# Patient Record
Sex: MALE | Employment: OTHER | URBAN - METROPOLITAN AREA
[De-identification: names, ages, dates, MRNs, and addresses within clinical notes are randomized per-mention and may not be internally consistent; named-entity substitution may affect disease eponyms.]

---

## 2017-01-30 PROBLEM — R26.9 ALTERED GAIT: Status: ACTIVE | Noted: 2017-01-30

## 2017-01-30 PROBLEM — IMO0001 VERTEBRAL COMPRESSION FRACTURE, SEQUELA: Status: ACTIVE | Noted: 2017-01-30

## 2017-07-26 PROCEDURE — 88305 TISSUE EXAM BY PATHOLOGIST: CPT | Performed by: INTERNAL MEDICINE

## 2017-10-03 ENCOUNTER — IMMUNIZATION (OUTPATIENT)
Dept: INTERNAL MEDICINE CLINIC | Facility: CLINIC | Age: 77
End: 2017-10-03

## 2017-10-03 PROCEDURE — 90653 IIV ADJUVANT VACCINE IM: CPT | Performed by: INTERNAL MEDICINE

## 2017-10-03 PROCEDURE — G0008 ADMIN INFLUENZA VIRUS VAC: HCPCS | Performed by: INTERNAL MEDICINE

## 2017-11-01 PROCEDURE — 86334 IMMUNOFIX E-PHORESIS SERUM: CPT | Performed by: OTHER

## 2017-11-01 PROCEDURE — 84425 ASSAY OF VITAMIN B-1: CPT | Performed by: OTHER

## 2017-11-01 PROCEDURE — 82607 VITAMIN B-12: CPT | Performed by: OTHER

## 2017-11-01 PROCEDURE — 84165 PROTEIN E-PHORESIS SERUM: CPT | Performed by: OTHER

## 2017-11-01 PROCEDURE — 82746 ASSAY OF FOLIC ACID SERUM: CPT | Performed by: OTHER

## 2017-11-01 PROCEDURE — 83883 ASSAY NEPHELOMETRY NOT SPEC: CPT | Performed by: OTHER

## 2017-11-01 PROCEDURE — 83921 ORGANIC ACID SINGLE QUANT: CPT | Performed by: OTHER

## 2017-12-08 PROBLEM — N39.41 URGE INCONTINENCE: Status: ACTIVE | Noted: 2017-12-08

## 2017-12-08 PROCEDURE — 88108 CYTOPATH CONCENTRATE TECH: CPT | Performed by: UROLOGY

## 2017-12-08 PROCEDURE — 81015 MICROSCOPIC EXAM OF URINE: CPT | Performed by: UROLOGY

## 2018-01-24 PROCEDURE — 81015 MICROSCOPIC EXAM OF URINE: CPT | Performed by: INTERNAL MEDICINE

## 2018-01-31 PROCEDURE — 81001 URINALYSIS AUTO W/SCOPE: CPT | Performed by: INTERNAL MEDICINE

## 2018-05-07 PROBLEM — IMO0001 VERTEBRAL COMPRESSION FRACTURE, SEQUELA: Status: RESOLVED | Noted: 2017-01-30 | Resolved: 2018-05-07

## 2018-05-07 PROBLEM — Z86.73 HISTORY OF CVA (CEREBROVASCULAR ACCIDENT) WITHOUT RESIDUAL DEFICITS: Status: ACTIVE | Noted: 2018-05-07

## 2018-07-16 PROBLEM — I65.23 BILATERAL CAROTID ARTERY STENOSIS: Status: ACTIVE | Noted: 2018-07-16

## 2018-10-12 ENCOUNTER — IMMUNIZATION (OUTPATIENT)
Dept: INTERNAL MEDICINE CLINIC | Facility: CLINIC | Age: 78
End: 2018-10-12
Payer: MEDICARE

## 2018-10-12 PROCEDURE — G0008 ADMIN INFLUENZA VIRUS VAC: HCPCS | Performed by: INTERNAL MEDICINE

## 2018-10-12 PROCEDURE — 90653 IIV ADJUVANT VACCINE IM: CPT | Performed by: INTERNAL MEDICINE

## 2018-11-02 PROCEDURE — 83520 IMMUNOASSAY QUANT NOS NONAB: CPT | Performed by: OTHER

## 2018-11-02 PROCEDURE — 86618 LYME DISEASE ANTIBODY: CPT | Performed by: OTHER

## 2018-11-02 PROCEDURE — 83921 ORGANIC ACID SINGLE QUANT: CPT | Performed by: OTHER

## 2019-05-13 PROBLEM — R26.9 ALTERED GAIT: Status: RESOLVED | Noted: 2017-01-30 | Resolved: 2019-05-13

## 2019-10-25 ENCOUNTER — IMMUNIZATION (OUTPATIENT)
Dept: INTERNAL MEDICINE CLINIC | Facility: CLINIC | Age: 79
End: 2019-10-25
Payer: MEDICARE

## 2019-10-25 DIAGNOSIS — Z23 NEED FOR VACCINATION: ICD-10-CM

## 2019-10-25 PROCEDURE — G0008 ADMIN INFLUENZA VIRUS VAC: HCPCS | Performed by: INTERNAL MEDICINE

## 2019-10-25 PROCEDURE — 90662 IIV NO PRSV INCREASED AG IM: CPT | Performed by: INTERNAL MEDICINE

## 2020-02-06 ENCOUNTER — HOSPITAL ENCOUNTER (INPATIENT)
Facility: HOSPITAL | Age: 80
LOS: 5 days | Discharge: NURSING, REHAB OR LONG-TERM CARE | DRG: 061 | End: 2020-02-12
Attending: EMERGENCY MEDICINE | Admitting: HOSPITALIST
Payer: MEDICARE

## 2020-02-06 DIAGNOSIS — I63.9 ACUTE ISCHEMIC STROKE (HCC): Primary | ICD-10-CM

## 2020-02-06 DIAGNOSIS — I44.7 LBBB (LEFT BUNDLE BRANCH BLOCK): ICD-10-CM

## 2020-02-07 ENCOUNTER — APPOINTMENT (OUTPATIENT)
Dept: CT IMAGING | Facility: HOSPITAL | Age: 80
DRG: 061 | End: 2020-02-07
Attending: EMERGENCY MEDICINE
Payer: MEDICARE

## 2020-02-07 ENCOUNTER — APPOINTMENT (OUTPATIENT)
Dept: GENERAL RADIOLOGY | Facility: HOSPITAL | Age: 80
DRG: 061 | End: 2020-02-07
Attending: HOSPITALIST
Payer: MEDICARE

## 2020-02-07 ENCOUNTER — APPOINTMENT (OUTPATIENT)
Dept: MRI IMAGING | Facility: HOSPITAL | Age: 80
DRG: 061 | End: 2020-02-07
Attending: HOSPITALIST
Payer: MEDICARE

## 2020-02-07 ENCOUNTER — APPOINTMENT (OUTPATIENT)
Dept: CV DIAGNOSTICS | Facility: HOSPITAL | Age: 80
DRG: 061 | End: 2020-02-07
Attending: EMERGENCY MEDICINE
Payer: MEDICARE

## 2020-02-07 PROBLEM — I63.9 ACUTE ISCHEMIC STROKE (HCC): Status: ACTIVE | Noted: 2020-02-07

## 2020-02-07 PROBLEM — I44.7 LBBB (LEFT BUNDLE BRANCH BLOCK): Status: ACTIVE | Noted: 2020-02-07

## 2020-02-07 LAB
ALBUMIN SERPL-MCNC: 3.5 G/DL (ref 3.4–5)
ALBUMIN/GLOB SERPL: 1.1 {RATIO} (ref 1–2)
ALP LIVER SERPL-CCNC: 63 U/L (ref 45–117)
ALT SERPL-CCNC: 17 U/L (ref 16–61)
ANION GAP SERPL CALC-SCNC: 4 MMOL/L (ref 0–18)
ANTIBODY SCREEN: NEGATIVE
APTT PPP: 28.7 SECONDS (ref 25.4–36.1)
AST SERPL-CCNC: 16 U/L (ref 15–37)
ATRIAL RATE: 81 BPM
BASOPHILS # BLD AUTO: 0.06 X10(3) UL (ref 0–0.2)
BASOPHILS # BLD AUTO: 0.08 X10(3) UL (ref 0–0.2)
BASOPHILS NFR BLD AUTO: 0.7 %
BASOPHILS NFR BLD AUTO: 1 %
BILIRUB SERPL-MCNC: 0.3 MG/DL (ref 0.1–2)
BUN BLD-MCNC: 15 MG/DL (ref 7–18)
BUN/CREAT SERPL: 16.7 (ref 10–20)
CALCIUM BLD-MCNC: 8.9 MG/DL (ref 8.5–10.1)
CHLORIDE SERPL-SCNC: 110 MMOL/L (ref 98–112)
CHOLEST SMN-MCNC: 147 MG/DL (ref ?–200)
CO2 SERPL-SCNC: 28 MMOL/L (ref 21–32)
CREAT BLD-MCNC: 0.9 MG/DL (ref 0.7–1.3)
CRP SERPL-MCNC: <0.29 MG/DL (ref ?–0.3)
DEPRECATED RDW RBC AUTO: 44.8 FL (ref 35.1–46.3)
DEPRECATED RDW RBC AUTO: 45.6 FL (ref 35.1–46.3)
EOSINOPHIL # BLD AUTO: 0.45 X10(3) UL (ref 0–0.7)
EOSINOPHIL # BLD AUTO: 0.82 X10(3) UL (ref 0–0.7)
EOSINOPHIL NFR BLD AUTO: 10.1 %
EOSINOPHIL NFR BLD AUTO: 5.4 %
ERYTHROCYTE [DISTWIDTH] IN BLOOD BY AUTOMATED COUNT: 12.4 % (ref 11–15)
ERYTHROCYTE [DISTWIDTH] IN BLOOD BY AUTOMATED COUNT: 12.7 % (ref 11–15)
EST. AVERAGE GLUCOSE BLD GHB EST-MCNC: 94 MG/DL (ref 68–126)
GLOBULIN PLAS-MCNC: 3.3 G/DL (ref 2.8–4.4)
GLUCOSE BLD-MCNC: 106 MG/DL (ref 70–99)
GLUCOSE BLD-MCNC: 112 MG/DL (ref 70–99)
GLUCOSE BLD-MCNC: 73 MG/DL (ref 70–99)
GLUCOSE BLD-MCNC: 92 MG/DL (ref 70–99)
GLUCOSE BLD-MCNC: 98 MG/DL (ref 70–99)
HAV IGM SER QL: 2.2 MG/DL (ref 1.6–2.6)
HBA1C MFR BLD HPLC: 4.9 % (ref ?–5.7)
HCT VFR BLD AUTO: 39.8 % (ref 39–53)
HCT VFR BLD AUTO: 42.9 % (ref 39–53)
HDLC SERPL-MCNC: 75 MG/DL (ref 40–59)
HGB BLD-MCNC: 13.3 G/DL (ref 13–17.5)
HGB BLD-MCNC: 14.3 G/DL (ref 13–17.5)
IMM GRANULOCYTES # BLD AUTO: 0.02 X10(3) UL (ref 0–1)
IMM GRANULOCYTES # BLD AUTO: 0.02 X10(3) UL (ref 0–1)
IMM GRANULOCYTES NFR BLD: 0.2 %
IMM GRANULOCYTES NFR BLD: 0.2 %
INR BLD: 0.95 (ref 0.9–1.1)
L PNEUMO AG UR QL: NEGATIVE
LDLC SERPL CALC-MCNC: 55 MG/DL (ref ?–100)
LYMPHOCYTES # BLD AUTO: 1.52 X10(3) UL (ref 1–4)
LYMPHOCYTES # BLD AUTO: 2.47 X10(3) UL (ref 1–4)
LYMPHOCYTES NFR BLD AUTO: 18.3 %
LYMPHOCYTES NFR BLD AUTO: 30.4 %
M PROTEIN MFR SERPL ELPH: 6.8 G/DL (ref 6.4–8.2)
MCH RBC QN AUTO: 32.4 PG (ref 26–34)
MCH RBC QN AUTO: 32.6 PG (ref 26–34)
MCHC RBC AUTO-ENTMCNC: 33.3 G/DL (ref 31–37)
MCHC RBC AUTO-ENTMCNC: 33.4 G/DL (ref 31–37)
MCV RBC AUTO: 97.1 FL (ref 80–100)
MCV RBC AUTO: 97.7 FL (ref 80–100)
MONOCYTES # BLD AUTO: 0.6 X10(3) UL (ref 0.1–1)
MONOCYTES # BLD AUTO: 0.81 X10(3) UL (ref 0.1–1)
MONOCYTES NFR BLD AUTO: 10 %
MONOCYTES NFR BLD AUTO: 7.2 %
NEUTROPHILS # BLD AUTO: 3.93 X10 (3) UL (ref 1.5–7.7)
NEUTROPHILS # BLD AUTO: 3.93 X10(3) UL (ref 1.5–7.7)
NEUTROPHILS # BLD AUTO: 5.66 X10 (3) UL (ref 1.5–7.7)
NEUTROPHILS # BLD AUTO: 5.66 X10(3) UL (ref 1.5–7.7)
NEUTROPHILS NFR BLD AUTO: 48.3 %
NEUTROPHILS NFR BLD AUTO: 68.2 %
NONHDLC SERPL-MCNC: 72 MG/DL (ref ?–130)
OSMOLALITY SERPL CALC.SUM OF ELEC: 293 MOSM/KG (ref 275–295)
P AXIS: -24 DEGREES
P-R INTERVAL: 170 MS
PLATELET # BLD AUTO: 148 10(3)UL (ref 150–450)
PLATELET # BLD AUTO: 163 10(3)UL (ref 150–450)
POTASSIUM SERPL-SCNC: 4.1 MMOL/L (ref 3.5–5.1)
PSA SERPL DL<=0.01 NG/ML-MCNC: 13.1 SECONDS (ref 12.5–14.7)
Q-T INTERVAL: 462 MS
QRS DURATION: 158 MS
QTC CALCULATION (BEZET): 536 MS
R AXIS: -10 DEGREES
RBC # BLD AUTO: 4.1 X10(6)UL (ref 3.8–5.8)
RBC # BLD AUTO: 4.39 X10(6)UL (ref 3.8–5.8)
RH BLOOD TYPE: POSITIVE
SODIUM SERPL-SCNC: 142 MMOL/L (ref 136–145)
STREP PNEUMO ANTIGEN, URINE: NEGATIVE
T AXIS: 141 DEGREES
TRIGL SERPL-MCNC: 85 MG/DL (ref 30–149)
TROPONIN I SERPL-MCNC: <0.045 NG/ML (ref ?–0.04)
VENTRICULAR RATE: 81 BPM
VLDLC SERPL CALC-MCNC: 17 MG/DL (ref 0–30)
WBC # BLD AUTO: 8.1 X10(3) UL (ref 4–11)
WBC # BLD AUTO: 8.3 X10(3) UL (ref 4–11)

## 2020-02-07 PROCEDURE — 99291 CRITICAL CARE FIRST HOUR: CPT | Performed by: OTHER

## 2020-02-07 PROCEDURE — 70450 CT HEAD/BRAIN W/O DYE: CPT | Performed by: EMERGENCY MEDICINE

## 2020-02-07 PROCEDURE — 71045 X-RAY EXAM CHEST 1 VIEW: CPT | Performed by: HOSPITALIST

## 2020-02-07 PROCEDURE — 70498 CT ANGIOGRAPHY NECK: CPT | Performed by: EMERGENCY MEDICINE

## 2020-02-07 PROCEDURE — 99291 CRITICAL CARE FIRST HOUR: CPT | Performed by: NURSE PRACTITIONER

## 2020-02-07 PROCEDURE — 70496 CT ANGIOGRAPHY HEAD: CPT | Performed by: EMERGENCY MEDICINE

## 2020-02-07 PROCEDURE — 70551 MRI BRAIN STEM W/O DYE: CPT | Performed by: NURSE PRACTITIONER

## 2020-02-07 PROCEDURE — 93306 TTE W/DOPPLER COMPLETE: CPT | Performed by: NURSE PRACTITIONER

## 2020-02-07 PROCEDURE — 3E03317 INTRODUCTION OF OTHER THROMBOLYTIC INTO PERIPHERAL VEIN, PERCUTANEOUS APPROACH: ICD-10-PCS | Performed by: INTERNAL MEDICINE

## 2020-02-07 PROCEDURE — 70450 CT HEAD/BRAIN W/O DYE: CPT | Performed by: NURSE PRACTITIONER

## 2020-02-07 RX ORDER — SENNOSIDES 8.6 MG
17.2 TABLET ORAL NIGHTLY
Status: DISCONTINUED | OUTPATIENT
Start: 2020-02-07 | End: 2020-02-12

## 2020-02-07 RX ORDER — LABETALOL HYDROCHLORIDE 5 MG/ML
10 INJECTION, SOLUTION INTRAVENOUS EVERY 10 MIN PRN
Status: DISCONTINUED | OUTPATIENT
Start: 2020-02-07 | End: 2020-02-07

## 2020-02-07 RX ORDER — DIPHENHYDRAMINE HYDROCHLORIDE 50 MG/ML
50 INJECTION INTRAMUSCULAR; INTRAVENOUS ONCE AS NEEDED
Status: DISCONTINUED | OUTPATIENT
Start: 2020-02-07 | End: 2020-02-07

## 2020-02-07 RX ORDER — LABETALOL HYDROCHLORIDE 5 MG/ML
10 INJECTION, SOLUTION INTRAVENOUS ONCE AS NEEDED
Status: COMPLETED | OUTPATIENT
Start: 2020-02-07 | End: 2020-02-07

## 2020-02-07 RX ORDER — FOLIC ACID 1 MG/1
1 TABLET ORAL DAILY
Status: DISCONTINUED | OUTPATIENT
Start: 2020-02-07 | End: 2020-02-12

## 2020-02-07 RX ORDER — FAMOTIDINE 10 MG/ML
20 INJECTION, SOLUTION INTRAVENOUS ONCE AS NEEDED
Status: DISCONTINUED | OUTPATIENT
Start: 2020-02-07 | End: 2020-02-07

## 2020-02-07 RX ORDER — SODIUM PHOSPHATE, DIBASIC AND SODIUM PHOSPHATE, MONOBASIC 7; 19 G/133ML; G/133ML
1 ENEMA RECTAL ONCE AS NEEDED
Status: DISCONTINUED | OUTPATIENT
Start: 2020-02-07 | End: 2020-02-12

## 2020-02-07 RX ORDER — METHYLPREDNISOLONE SODIUM SUCCINATE 125 MG/2ML
125 INJECTION, POWDER, LYOPHILIZED, FOR SOLUTION INTRAMUSCULAR; INTRAVENOUS ONCE AS NEEDED
Status: DISCONTINUED | OUTPATIENT
Start: 2020-02-07 | End: 2020-02-07

## 2020-02-07 RX ORDER — DIPHENHYDRAMINE HYDROCHLORIDE 50 MG/ML
50 INJECTION INTRAMUSCULAR; INTRAVENOUS ONCE AS NEEDED
Status: ACTIVE | OUTPATIENT
Start: 2020-02-07 | End: 2020-02-07

## 2020-02-07 RX ORDER — FAMOTIDINE 10 MG/ML
20 INJECTION, SOLUTION INTRAVENOUS ONCE AS NEEDED
Status: ACTIVE | OUTPATIENT
Start: 2020-02-07 | End: 2020-02-07

## 2020-02-07 RX ORDER — DOXEPIN HYDROCHLORIDE 50 MG/1
1 CAPSULE ORAL DAILY
Status: DISCONTINUED | OUTPATIENT
Start: 2020-02-07 | End: 2020-02-12

## 2020-02-07 RX ORDER — METOCLOPRAMIDE HYDROCHLORIDE 5 MG/ML
10 INJECTION INTRAMUSCULAR; INTRAVENOUS EVERY 8 HOURS PRN
Status: DISCONTINUED | OUTPATIENT
Start: 2020-02-07 | End: 2020-02-12

## 2020-02-07 RX ORDER — PHENYLEPHRINE HCL IN 0.9% NACL 50MG/250ML
PLASTIC BAG, INJECTION (ML) INTRAVENOUS CONTINUOUS PRN
Status: DISCONTINUED | OUTPATIENT
Start: 2020-02-07 | End: 2020-02-10 | Stop reason: ALTCHOICE

## 2020-02-07 RX ORDER — METHYLPREDNISOLONE SODIUM SUCCINATE 125 MG/2ML
125 INJECTION, POWDER, LYOPHILIZED, FOR SOLUTION INTRAMUSCULAR; INTRAVENOUS ONCE AS NEEDED
Status: ACTIVE | OUTPATIENT
Start: 2020-02-07 | End: 2020-02-07

## 2020-02-07 RX ORDER — DOCUSATE SODIUM 100 MG/1
100 CAPSULE, LIQUID FILLED ORAL 2 TIMES DAILY
Status: DISCONTINUED | OUTPATIENT
Start: 2020-02-07 | End: 2020-02-08 | Stop reason: SDUPTHER

## 2020-02-07 RX ORDER — ACETAMINOPHEN 650 MG/1
650 SUPPOSITORY RECTAL EVERY 4 HOURS PRN
Status: DISCONTINUED | OUTPATIENT
Start: 2020-02-07 | End: 2020-02-12

## 2020-02-07 RX ORDER — GABAPENTIN 300 MG/1
300 CAPSULE ORAL 2 TIMES DAILY
Status: DISCONTINUED | OUTPATIENT
Start: 2020-02-07 | End: 2020-02-09

## 2020-02-07 RX ORDER — ACETAMINOPHEN 325 MG/1
650 TABLET ORAL EVERY 4 HOURS PRN
Status: DISCONTINUED | OUTPATIENT
Start: 2020-02-07 | End: 2020-02-12

## 2020-02-07 RX ORDER — MORPHINE SULFATE 4 MG/ML
1 INJECTION, SOLUTION INTRAMUSCULAR; INTRAVENOUS EVERY 2 HOUR PRN
Status: DISCONTINUED | OUTPATIENT
Start: 2020-02-07 | End: 2020-02-12

## 2020-02-07 RX ORDER — ACETAMINOPHEN 650 MG/1
650 SUPPOSITORY RECTAL EVERY 6 HOURS PRN
Status: DISCONTINUED | OUTPATIENT
Start: 2020-02-07 | End: 2020-02-12

## 2020-02-07 RX ORDER — SODIUM CHLORIDE 9 MG/ML
INJECTION, SOLUTION INTRAVENOUS CONTINUOUS
Status: DISCONTINUED | OUTPATIENT
Start: 2020-02-07 | End: 2020-02-12

## 2020-02-07 RX ORDER — POLYETHYLENE GLYCOL 3350 17 G/17G
17 POWDER, FOR SOLUTION ORAL DAILY PRN
Status: DISCONTINUED | OUTPATIENT
Start: 2020-02-07 | End: 2020-02-12

## 2020-02-07 RX ORDER — MELATONIN
100 DAILY
Status: DISCONTINUED | OUTPATIENT
Start: 2020-02-07 | End: 2020-02-12

## 2020-02-07 RX ORDER — LABETALOL HYDROCHLORIDE 5 MG/ML
INJECTION, SOLUTION INTRAVENOUS
Status: COMPLETED
Start: 2020-02-07 | End: 2020-02-07

## 2020-02-07 RX ORDER — HYDROMORPHONE HYDROCHLORIDE 1 MG/ML
0.2 INJECTION, SOLUTION INTRAMUSCULAR; INTRAVENOUS; SUBCUTANEOUS EVERY 2 HOUR PRN
Status: DISCONTINUED | OUTPATIENT
Start: 2020-02-07 | End: 2020-02-12

## 2020-02-07 RX ORDER — FAMOTIDINE 20 MG/1
20 TABLET ORAL DAILY
Status: DISCONTINUED | OUTPATIENT
Start: 2020-02-07 | End: 2020-02-12

## 2020-02-07 RX ORDER — ONDANSETRON 2 MG/ML
4 INJECTION INTRAMUSCULAR; INTRAVENOUS EVERY 6 HOURS PRN
Status: DISCONTINUED | OUTPATIENT
Start: 2020-02-07 | End: 2020-02-12

## 2020-02-07 RX ORDER — BISACODYL 10 MG
10 SUPPOSITORY, RECTAL RECTAL
Status: DISCONTINUED | OUTPATIENT
Start: 2020-02-07 | End: 2020-02-12

## 2020-02-07 RX ORDER — FAMOTIDINE 10 MG/ML
20 INJECTION, SOLUTION INTRAVENOUS DAILY
Status: DISCONTINUED | OUTPATIENT
Start: 2020-02-07 | End: 2020-02-12

## 2020-02-07 RX ORDER — ASPIRIN 325 MG
325 TABLET ORAL DAILY
Status: DISCONTINUED | OUTPATIENT
Start: 2020-02-08 | End: 2020-02-12

## 2020-02-07 RX ORDER — MORPHINE SULFATE 4 MG/ML
2 INJECTION, SOLUTION INTRAMUSCULAR; INTRAVENOUS EVERY 2 HOUR PRN
Status: DISCONTINUED | OUTPATIENT
Start: 2020-02-07 | End: 2020-02-12

## 2020-02-07 RX ORDER — ASPIRIN 300 MG
300 SUPPOSITORY, RECTAL RECTAL DAILY
Status: DISCONTINUED | OUTPATIENT
Start: 2020-02-08 | End: 2020-02-12

## 2020-02-07 RX ORDER — ATORVASTATIN CALCIUM 80 MG/1
80 TABLET, FILM COATED ORAL NIGHTLY
Status: DISCONTINUED | OUTPATIENT
Start: 2020-02-07 | End: 2020-02-12

## 2020-02-07 NOTE — ED NOTES
Pt speech clarity improved, slight downward lip droop Lt side. Lt eye movement visible slightly lateral of midline. Slight epistaxis after blowing nose. MD aware. Nose clamp in place.

## 2020-02-07 NOTE — ED NOTES
Per MD at bedside, ok to administer Labetalol 5mg IV d/t pt BP prior to administration of tPA bolus.

## 2020-02-07 NOTE — PROCEDURES
[facility]  Pre-Procedure Note  Vladimir Arnlod Patient Status:  Inpatient    1940 MRN [id number]   [facility] 6NE-A Attending Angélica Mead, *   Hosp Day # 0 PCP Daya Hi MD     Pre-Op Diagnosis:  79 YO WM, PMHx of r

## 2020-02-07 NOTE — ED NOTES
Report called to [place] [employer] RN, room 1423.  Pt to be transported on monitor w/RN

## 2020-02-07 NOTE — PHYSICAL THERAPY NOTE
Physical Therapy    Orders received per Stroke protocol. Pt remains on bedrest and is s/p TPA administration. Will assess once activity orders are received.

## 2020-02-07 NOTE — CONSULTS
[facility] [address] [place] [address] Cardiology  Report of Consultation    Sonya Young Patient Status:  Inpatient    1940 MRN [id number]   [facility] 6NE-A Attending Eun Espinosa, *   Hosp Day # 0 PCP Luiz Andrade MD     Reason for Consu famoTIDine **AND** EPINEPHrine HCl, acetaminophen, phenylephrine, PEG 3350, magnesium hydroxide, bisacodyl, Fleet Enema, ondansetron HCl **OR** Metoclopramide HCl, morphINE sulfate **OR** morphINE sulfate, HYDROmorphone HCl    Outpatient Medications:   No from Last 3 Encounters:  02/07/20 : 208 lb 15.9 oz  09/06/19 : 200 lb  09/06/19 : 200 lb          General: Well developed, well nourished male. Pt is in no acute distress. HEENT:   Normocephalic. Atraumatic. Eyes with no scleral icterus. Neck: Supple. and tends to add to confusion. 3. Lipids: Statin.     Inga Habermann, MD  2/7/2020  1:24 PM

## 2020-02-07 NOTE — ED PROVIDER NOTES
Patient Seen in: [facility] Emergency Department      History   Patient presents with:  Neurologic Problem    Stated Complaint: stroke red    HPI    Patient is an 25-year-old male brought in by EMS for left hemiparesis and facial droop.   History is LAMINECTOMY 3 LEVEL N/A 4/27/2015    Performed by Lizz Gann MD at [facility] [place] [facility] MAIN OR   • OTHER SURGICAL HISTORY      hand surgery right tendon   • OTHER SURGICAL HISTORY  1/16/2013    right hip ORIF of intertrochanteric fx by Dr. Tamara Hernandez   • [address] [facility] [address] speech is slurred. Complete left lower facial droop  Gaze fixed to the right  He struggles to find words but will answer questions with 1 or 2 word answers, his speech is significantly slurred. Does appear to have hemineglect for the left side. management. This was discussed with the wife. Risks discussed include serious intracranial hemorrhage resulting in death permanent devastating disability. Wife agreed to TPA. He did require labetalol and nicardipine for blood pressure control.      Noted POA    Acute ischemic stroke ([facility] [address] I63.9 2/7/2020 Unknown

## 2020-02-07 NOTE — PLAN OF CARE
Received pt from ER around 0200. Pt oriented x4 and slightly drowsy, able to follow commands on right side, pupils PERRL, and pt denied headache at time.  Pt has a right field cut and a right-sided gaze; this makes it hard for him to touch his right finger

## 2020-02-07 NOTE — H&P
[facility] Hospitalist H&P       CC: acute CVA     PCP: Luiz Andrade MD    History of Present Illness: Pt is an 79 yo with HTN/HL, GERD, cerebrovascular dz with hx CVA (no residual), depression who is admitted for acute CVA. Found last night to have L hemip 3/9/2016    Performed by Laurita Peace MD at [address]:  No Known Allergies     Home Medications:  No outpatient medications have been marked as taking for the 2/6/20 encounter [facility] Encounter).         Soc Hx  Social H 163.0 148.0*   INR 0.95  --        Recent Labs   Lab 02/07/20  0020 02/07/20  0024     --    K 4.1  --      --    CO2 28.0  --    BUN 15  --    CREATSERUM 0.90  --    GLU 73  --    CA 8.9  --    MG  --  2.2       Recent Labs   Lab 02/07/20  002 Mild to moderate intracranial atherosclerosis. Moderate to severe stenosis is seen at the vertebral artery origins. Mild to moderate densely calcified atherosclerosis of the carotid bulbs without significant narrowing.  3. Emphysematous changes seen withi

## 2020-02-07 NOTE — PROCEDURES
[facility]  Neurointerventional Surgery Operative Report  Martha Ambrosio Patient Status:  Inpatient    1940 MRN [id number]   [facility] 6NE-A Attending Dougie Falcon, *   Hosp Day # 0 PCP Aurora Bloom MD     Procedure: c

## 2020-02-07 NOTE — PROGRESS NOTES
[facility]  Neurocritical Care APRN Progress Note    NAME: Sonya Young - ROOM: 5693/8627-D - MRN: [id number] - Age: 78year old - :1940    History Of Present Illness:  Sonya Young is a 78year old male with PMHx significa stenosis 7/16/2018   • Depression    • Esophageal reflux    • Hyperlipidemia    • Stroke [facility]) 2-6-11   • Unspecified essential hypertension      Past Surgical History:   Procedure Laterality Date   • Back surgery  4/27/15    L2-L5 decomp   • Colonoscopy N epidural/subarachnoid; lumbar/sacral  10/21/2013    Procedure: LUMBAR EPIDURAL;  Surgeon: Karol Díaz MD;  Location: [facility] FOR PAIN MANAGEMENT   • Injection, w/wo contrast, dx/therapeutic substance, epidural/subarachnoid; lumbar/sacral N/A 8/8/201 11/10/2014    Procedure: LUMBAR EPIDURAL;  Surgeon: Vladimir Worthy MD;  Location: [address] [place] [address]   • Patient documented not to have experienced any of the following events Right 3/9/2016    Procedure: TRANSFORAMINAL EPIDURAL - LUMBAR;  [address] Problem Relation Age of Onset   • Heart Disorder Father         MI   • Cancer Mother         breast ca   • Heart Disorder Sister         stroke      Review of Systems:   A comprehensive 10 point review of systems was completed.   Pertinent positives and n this admission:  No results found.     Labs:  Lab Results   Component Value Date    WBC 8.1 02/07/2020    HGB 14.3 02/07/2020    HCT 42.9 02/07/2020    .0 02/07/2020    CREATSERUM 0.90 02/07/2020    BUN 15 02/07/2020     02/07/2020    K 4.1 02/ Neuro-Intensivist On-Call, Megan Neves 3 NP  [address]: [phone]  [facility]: [phone]    A total of 35 minutes of critical care time (exclusive of billable procedures) was administered.  This involved direct patient interven

## 2020-02-07 NOTE — CONSULTS
[facility]  Neurocritical Care       Name: Bren Baker  MRN: [id number]  Admission Date/Time: 2/6/2020 11:59 PM  Primary Care Provider:  Nadine Ventura MD        Reason for Consultation:   Acute stroke s/p IV t-pa    HPI:   Patient is BIOPSY, POSSIBLE POLYPECTOMY [address] N/A 7/26/2017    Performed by Ro Hunt MD at [place] [address] N/A 11/10/2014    Performed by Joy Leon MD at [address] [place] [address] DAILY, Disp: 30 capsule, Rfl: 11  Citalopram Hydrobromide 10 MG Oral Tab, TAKE 1 TABLET (10 MG) BY ORAL ROUTE ONCE DAILY, Disp: 30 tablet, Rfl: 11  raNITIdine HCl 150 MG Oral Tab, One tab twice daily, Disp: 60 tablet, Rfl: 11  B Complex Vitamins (VITAMIN B 0.3 mg, 0.3 mg, Subcutaneous, Once PRN  acetaminophen (TYLENOL) tab 650 mg, 650 mg, Oral, Q4H PRN  phenylephrine in NaCl (JED-SYNEPHRINE) 50 mg/250 ml premix infusion SOLN, 100-200 mcg/min, Intravenous, Continuous PRN  atorvastatin (LIPITOR) tab 80 mg, 80 open areas. Neuro:                  Left sided weakness and facial droop. All other systems were reviewed and are negative. Vitals:   Blood pressure 144/60, pulse 77, temperature 98.8 °F (37.1 °C), temperature source Temporal, resp.  rate 21, weight headache after receiving TPA    FINDINGS:  Mild to moderate diffuse volume loss. Moderate chronic small vessel ischemic disease. There is no midline shift or mass-effect. The basal cisterns are patent. The gray-white matter differentiation is intact. Critical results were discussed with Dr. Pilar Miranda by Dr. Inderjit Lowery at [address] hours on 2/7/2020. Critical results were read back.    Dictated by: Meka Charles MD on 2/07/2020 at 6:54     Approved by: Meka Charles MD on 2/07/2020 at 6:58          Ct Stroke narrowing. There is no evidence of hemodynamically significant stenosis in the carotid bulbs by NASCET criteria. The cervical internal carotid arteries are patent. The vertebral arteries originate from the subclavian arteries.   The origins of the verteb CA 8.9 02/07/2020    ALB 3.5 02/07/2020    ALKPHO 63 02/07/2020    BILT 0.3 02/07/2020    TP 6.8 02/07/2020    AST 16 02/07/2020    ALT 17 02/07/2020    PTT 28.7 02/07/2020    INR 0.95 02/07/2020    CRP <0.29 02/07/2020    MG 2.2 02/07/2020    TROP <0.0 procedures) was administered to manage and/or prevent neurologic instability. This involved direct patient intervention, complex decision making, and/or extensive discussions with the patient, family, and clinical staff.     Thank you for allowing me to par

## 2020-02-07 NOTE — SLP NOTE
ADULT SWALLOWING EVALUATION    ASSESSMENT    ASSESSMENT/OVERALL IMPRESSION:  Patient seen for swallowing evaluation per stroke protocol. Patient admitted due to left sided weakness. Patient alert but obviously fatigued during my visit.   Patient wife cont MEDICAL HISTORY  Reason for Referral: Stroke protocol    Problem List  Principal Problem:    Acute ischemic stroke [facility])  Active Problems:    LBBB (left bundle branch block)    Received intravenous tissue plasminogen activator (tPA) in emergency departme buccal)    Pharyngeal Phase of Swallow: Impaired  Laryngeal Elevation Timing: Appears impaired  Laryngeal Elevation Strength: Appears impaired  Laryngeal Elevation Coordination: Appears impaired  (Please note: Silent aspiration cannot be evaluated clinical

## 2020-02-07 NOTE — PROGRESS NOTES
Called with patient complaining of right side headache-new and moderate intensity. No improvement reported after Morphine dose, and NPO so no oral dosing available. STAT CT brain ordered to r/o bleeding as cause of HA.   No other changes reported in exam.

## 2020-02-08 ENCOUNTER — APPOINTMENT (OUTPATIENT)
Dept: ULTRASOUND IMAGING | Facility: HOSPITAL | Age: 80
DRG: 061 | End: 2020-02-08
Attending: HOSPITALIST
Payer: MEDICARE

## 2020-02-08 ENCOUNTER — APPOINTMENT (OUTPATIENT)
Dept: CT IMAGING | Facility: HOSPITAL | Age: 80
DRG: 061 | End: 2020-02-08
Attending: EMERGENCY MEDICINE
Payer: MEDICARE

## 2020-02-08 ENCOUNTER — APPOINTMENT (OUTPATIENT)
Dept: GENERAL RADIOLOGY | Facility: HOSPITAL | Age: 80
DRG: 061 | End: 2020-02-08
Attending: Other
Payer: MEDICARE

## 2020-02-08 LAB
ANION GAP SERPL CALC-SCNC: 5 MMOL/L (ref 0–18)
BASOPHILS # BLD AUTO: 0.06 X10(3) UL (ref 0–0.2)
BASOPHILS NFR BLD AUTO: 0.6 %
BUN BLD-MCNC: 12 MG/DL (ref 7–18)
BUN/CREAT SERPL: 16.9 (ref 10–20)
CALCIUM BLD-MCNC: 8 MG/DL (ref 8.5–10.1)
CHLORIDE SERPL-SCNC: 109 MMOL/L (ref 98–112)
CO2 SERPL-SCNC: 26 MMOL/L (ref 21–32)
CREAT BLD-MCNC: 0.71 MG/DL (ref 0.7–1.3)
DEPRECATED RDW RBC AUTO: 45.9 FL (ref 35.1–46.3)
EOSINOPHIL # BLD AUTO: 0.22 X10(3) UL (ref 0–0.7)
EOSINOPHIL NFR BLD AUTO: 2 %
ERYTHROCYTE [DISTWIDTH] IN BLOOD BY AUTOMATED COUNT: 12.8 % (ref 11–15)
GLUCOSE BLD-MCNC: 104 MG/DL (ref 70–99)
GLUCOSE BLD-MCNC: 105 MG/DL (ref 70–99)
HCT VFR BLD AUTO: 38.2 % (ref 39–53)
HGB BLD-MCNC: 12.5 G/DL (ref 13–17.5)
IMM GRANULOCYTES # BLD AUTO: 0.05 X10(3) UL (ref 0–1)
IMM GRANULOCYTES NFR BLD: 0.5 %
LYMPHOCYTES # BLD AUTO: 1.67 X10(3) UL (ref 1–4)
LYMPHOCYTES NFR BLD AUTO: 15.3 %
MCH RBC QN AUTO: 32.2 PG (ref 26–34)
MCHC RBC AUTO-ENTMCNC: 32.7 G/DL (ref 31–37)
MCV RBC AUTO: 98.5 FL (ref 80–100)
MONOCYTES # BLD AUTO: 0.99 X10(3) UL (ref 0.1–1)
MONOCYTES NFR BLD AUTO: 9.1 %
NEUTROPHILS # BLD AUTO: 7.9 X10 (3) UL (ref 1.5–7.7)
NEUTROPHILS # BLD AUTO: 7.9 X10(3) UL (ref 1.5–7.7)
NEUTROPHILS NFR BLD AUTO: 72.5 %
OSMOLALITY SERPL CALC.SUM OF ELEC: 290 MOSM/KG (ref 275–295)
PLATELET # BLD AUTO: 153 10(3)UL (ref 150–450)
POTASSIUM SERPL-SCNC: 3.4 MMOL/L (ref 3.5–5.1)
RBC # BLD AUTO: 3.88 X10(6)UL (ref 3.8–5.8)
SODIUM SERPL-SCNC: 140 MMOL/L (ref 136–145)
WBC # BLD AUTO: 10.9 X10(3) UL (ref 4–11)

## 2020-02-08 PROCEDURE — 71045 X-RAY EXAM CHEST 1 VIEW: CPT | Performed by: OTHER

## 2020-02-08 PROCEDURE — 70450 CT HEAD/BRAIN W/O DYE: CPT | Performed by: EMERGENCY MEDICINE

## 2020-02-08 PROCEDURE — 93971 EXTREMITY STUDY: CPT | Performed by: HOSPITALIST

## 2020-02-08 RX ORDER — DOCUSATE SODIUM 100 MG/1
100 CAPSULE, LIQUID FILLED ORAL 2 TIMES DAILY
Status: DISCONTINUED | OUTPATIENT
Start: 2020-02-08 | End: 2020-02-12

## 2020-02-08 RX ORDER — LISINOPRIL 5 MG/1
5 TABLET ORAL DAILY
Status: DISCONTINUED | OUTPATIENT
Start: 2020-02-08 | End: 2020-02-12

## 2020-02-08 RX ORDER — MORPHINE SULFATE 4 MG/ML
1 INJECTION, SOLUTION INTRAMUSCULAR; INTRAVENOUS EVERY 2 HOUR PRN
Status: DISCONTINUED | OUTPATIENT
Start: 2020-02-08 | End: 2020-02-12

## 2020-02-08 RX ORDER — CLOPIDOGREL BISULFATE 75 MG/1
75 TABLET ORAL DAILY
Status: DISCONTINUED | OUTPATIENT
Start: 2020-02-08 | End: 2020-02-10

## 2020-02-08 RX ORDER — MORPHINE SULFATE 4 MG/ML
2 INJECTION, SOLUTION INTRAMUSCULAR; INTRAVENOUS EVERY 2 HOUR PRN
Status: DISCONTINUED | OUTPATIENT
Start: 2020-02-08 | End: 2020-02-12

## 2020-02-08 RX ORDER — HEPARIN SODIUM 5000 [USP'U]/ML
5000 INJECTION, SOLUTION INTRAVENOUS; SUBCUTANEOUS EVERY 12 HOURS SCHEDULED
Status: DISCONTINUED | OUTPATIENT
Start: 2020-02-08 | End: 2020-02-12

## 2020-02-08 RX ORDER — LABETALOL HYDROCHLORIDE 5 MG/ML
10 INJECTION, SOLUTION INTRAVENOUS EVERY 10 MIN PRN
Status: DISCONTINUED | OUTPATIENT
Start: 2020-02-08 | End: 2020-02-12

## 2020-02-08 RX ORDER — HYDROCODONE BITARTRATE AND ACETAMINOPHEN 5; 325 MG/1; MG/1
1 TABLET ORAL EVERY 6 HOURS PRN
Status: DISCONTINUED | OUTPATIENT
Start: 2020-02-08 | End: 2020-02-12

## 2020-02-08 RX ORDER — HYDROCODONE BITARTRATE AND ACETAMINOPHEN 5; 325 MG/1; MG/1
2 TABLET ORAL EVERY 6 HOURS PRN
Status: DISCONTINUED | OUTPATIENT
Start: 2020-02-08 | End: 2020-02-12

## 2020-02-08 RX ORDER — HYDRALAZINE HYDROCHLORIDE 20 MG/ML
10 INJECTION INTRAMUSCULAR; INTRAVENOUS EVERY 4 HOURS PRN
Status: DISCONTINUED | OUTPATIENT
Start: 2020-02-08 | End: 2020-02-12

## 2020-02-08 NOTE — PLAN OF CARE
Assumed care of this patient at 0730. Neuro checks per post TPA order set. Please see complete neuro flow-sheet for complete assessment. Left visual field cut, left arm is flaccid, left leg 2/5 in strength, absent sensation on left side.  MRI of brain compl and description of seizure to MD/LIP  - If seizure occurs, turn patient to side and suction secretions as needed  - Reorient patient post seizure  - Seizure pads on all 4 side rails  - Instruct patient/family to notify RN of any seizure activity  - Instruc

## 2020-02-08 NOTE — DIETARY NOTE
[facility]    NUTRITION INITIAL ASSESSMENT    Pt does not meet malnutrition criteria.     NUTRITION DIAGNOSIS/PROBLEM:    Inadequate oral intake related to decreased ability to consume sufficient energy intake (CVA) as evidenced by NPO status per SLP a Yes    NUTRITION RELATED PHYSICAL FINDINGS:  Unable to conduct nutrition focused physical exam at this time.      NUTRITION PRESCRIPTION:89.2 kg  Calories: 3981-0190 calories/day (23-25 calories per kg)  Protein: 107-116 grams protein/day (1.2-1.3 grams pro

## 2020-02-08 NOTE — SLP NOTE
ADULT SWALLOWING EVALUATION    ASSESSMENT    ASSESSMENT/OVERALL IMPRESSION:  Patient seen for ongoing assessment of swallow function as per initial BSE completed yesterday. Pt remains in ICU and RN requested SLP to proceed.  Pt received awake, however appea MEDICAL HISTORY  Reason for Referral: Stroke protocol    Problem List  Principal Problem:    Acute ischemic stroke [facility])  Active Problems:    LBBB (left bundle branch block)    Received intravenous tissue plasminogen activator (tPA) in emergency departme aspiration.)    Esophageal Phase of Swallow: No complaints consistent with possible esophageal involvement        GOALS  Goal #1 Patient will participate in reassessment of swallow function  In Progress   Goal #2 Patient will participate in cognitive commu

## 2020-02-08 NOTE — PROGRESS NOTES
[facility] Cardiology Progress Note - Zoila Muhammad Patient Status:  Inpatient    1940 MRN [id number]   [facility] 6NE-A Attending Zay Oliveira, *   Hosp Day # 1 PCP Freddie Shi MD     Subjective:  Bárbara Rordigez Normal excursions and effort. Abdomen: Soft, non-tender. No organosplenomegally, mass or rebound, BS-present. Extremities: Without clubbing, cyanosis or edema. Peripheral pulses are 2+. Neurologic: Alert and oriented, normal affect.  No motor or coordin (FLEET) 7-19 GM/118ML enema 133 mL, 1 enema, Rectal, Once PRN  ondansetron HCl (ZOFRAN) injection 4 mg, 4 mg, Intravenous, Q6H PRN    Or  Metoclopramide HCl (REGLAN) injection 10 mg, 10 mg, Intravenous, Q8H PRN  famoTIDine (PEPCID) tab 20 mg, 20 mg, Oral,

## 2020-02-08 NOTE — PHYSICAL THERAPY NOTE
PHYSICAL THERAPY EVALUATION - INPATIENT     Room Number: 5606/2447-Q  Evaluation Date: 2/8/2020  Type of Evaluation: Initial  Physician Order: PT Eval and Treat    Presenting Problem: Acute ischemic CVA with tPA, L sided weakness  Reason for Therapy: Laurita Peace MD at [address] [place] [address] N/A 9/15/2014    Performed by Laurita Peace MD at [address] [place] [address] N/A 8/8/2014    Performed by Laurita Peace MD at [address] [place] [address] Sitting: Dependent  Static Standing: Not tested  Dynamic Standing: Not tested    ADDITIONAL TESTS  Additional Tests: Modified [place]              Modified [place]: 5      NEUROLOGICAL FINDINGS; Rigidity on the L side vs spasticity?          ACTIVITY TOLERANC re-educate  Posture  ROM  Transfer training    Patient End of Session: In bed;Needs met;Call light within reach;RN aware of session/findings; All patient questions and concerns addressed; Family present    ASSESSMENT   Patient is a 78year old male admitted assistance     Goal #3 Patient is able to ambulate 5 feet with assist device: TBD at assistance level: maximum assistance     Goal #4 Pt will; be able to sit EOB with fair sitting balance for at least 5 minutes   Goal #5    Goal #6    Goal Comments: Goals

## 2020-02-08 NOTE — PLAN OF CARE
Neuro exams hourly as charted, pt. With left sided deficits, see charting. Phenylephrine gtt to maintain -180. Dilaudid for c/o right leg pain with relief. Pt. Remains NPO. External catheter in place. Unable to obtain sputum culture, pt.  Unable

## 2020-02-08 NOTE — PROGRESS NOTES
[facility]  Neurocritical Care       Subjective: Redgie Hodgkins is a(n) 78year old male acute RMCA stroke s/P IV t-pa with left sided weakness, sitting in chair now. Complains of his left sciatica pain.     Review of Systems    Constit and symmetric. 5/5 right, LUE 0-1/5, LLE 2-3/5  Sensory: Decreased on the left to light touch. Cerebellar: Finger-nose-finger intact right. Gait: Deferred.     Diagnostics:   Ct Brain Or Head (82533)    Result Date: 2/8/2020  PROCEDURE:  CT BRAIN OR HEAD and chronic microvascular ischemic disease.    Dictated by: Page Peace MD on 2/08/2020 at 6:54     Approved by: Page Peace MD on 2/08/2020 at 6:57          Ct Brain Or Head (32154)    Result Date: 2/7/2020  PROCEDURE:  CT BRAIN OR HEAD (8490 PATIENT STATED HISTORY: (As transcribed by Technologist)  FINDINGS:   Prominence of the sulci is noted. There is no midline shift or mass effect. The basal cisterns are patent. The craniocervical junction is unremarkable.    Examination is limited by cough  PATIENT STATED HISTORY: (As transcribed by Technologist)  Patient offered no additional history at this time. FINDINGS:  There is airspace disease throughout the right upper lung suspicious for pneumonia or aspiration.   Minimal blunting of the co recommended for further evaluation. Critical results were discussed with Dr. Chelsie Yanes by Dr. Wai Canada at [address] hours on 2/7/2020. Critical results were read back.    Dictated by: Nuha Us MD on 2/07/2020 at 6:54     Approved by: Nuha Us MD o carotid bulbs without significant narrowing. There is no evidence of hemodynamically significant stenosis in the carotid bulbs by NASCET criteria. The cervical internal carotid arteries are patent.   The vertebral arteries originate from the subclavian ar 02/08/2020     02/08/2020    CA 8.0 02/08/2020     Recent Labs   Lab 02/07/20  0020 02/07/20  0446 02/08/20  0434   RBC 4.39 4.10 3.88   HGB 14.3 13.3 12.5*   HCT 42.9 39.8 38.2*   MCV 97.7 97.1 98.5   MCH 32.6 32.4 32.2   MCHC 33.3 33.4 32.7   RDW mL, 1 enema, Rectal, Once PRN  ondansetron HCl (ZOFRAN) injection 4 mg, 4 mg, Intravenous, Q6H PRN    Or  Metoclopramide HCl (REGLAN) injection 10 mg, 10 mg, Intravenous, Q8H PRN  famoTIDine (PEPCID) tab 20 mg, 20 mg, Oral, Daily    Or  famoTIDine (PEPCID) Eval.  12. Neuro checks Q 4hr      Cardiac:  - Systolic BP Goal 094-867, start low dose Lisinopril 5mg PO Q Daily, Use PRN Hydralazine if needed.   - Last EKG and overnight cardiac monitor   - Echo   Pulmonary:  - RA or O2 via NC     Renal:  - BUN/Cr 12/0.7

## 2020-02-09 LAB
ANION GAP SERPL CALC-SCNC: 2 MMOL/L (ref 0–18)
BASOPHILS # BLD AUTO: 0.06 X10(3) UL (ref 0–0.2)
BASOPHILS NFR BLD AUTO: 0.8 %
BUN BLD-MCNC: 21 MG/DL (ref 7–18)
BUN/CREAT SERPL: 23.9 (ref 10–20)
CALCIUM BLD-MCNC: 8.5 MG/DL (ref 8.5–10.1)
CHLORIDE SERPL-SCNC: 109 MMOL/L (ref 98–112)
CO2 SERPL-SCNC: 26 MMOL/L (ref 21–32)
CREAT BLD-MCNC: 0.88 MG/DL (ref 0.7–1.3)
DEPRECATED RDW RBC AUTO: 47 FL (ref 35.1–46.3)
EOSINOPHIL # BLD AUTO: 0.22 X10(3) UL (ref 0–0.7)
EOSINOPHIL NFR BLD AUTO: 2.8 %
ERYTHROCYTE [DISTWIDTH] IN BLOOD BY AUTOMATED COUNT: 12.5 % (ref 11–15)
GLUCOSE BLD-MCNC: 103 MG/DL (ref 70–99)
GLUCOSE BLD-MCNC: 115 MG/DL (ref 70–99)
GLUCOSE BLD-MCNC: 123 MG/DL (ref 70–99)
GLUCOSE BLD-MCNC: 123 MG/DL (ref 70–99)
GLUCOSE BLD-MCNC: 133 MG/DL (ref 70–99)
HCT VFR BLD AUTO: 37.1 % (ref 39–53)
HGB BLD-MCNC: 11.8 G/DL (ref 13–17.5)
IMM GRANULOCYTES # BLD AUTO: 0.02 X10(3) UL (ref 0–1)
IMM GRANULOCYTES NFR BLD: 0.3 %
LYMPHOCYTES # BLD AUTO: 1.38 X10(3) UL (ref 1–4)
LYMPHOCYTES NFR BLD AUTO: 17.9 %
MCH RBC QN AUTO: 32.1 PG (ref 26–34)
MCHC RBC AUTO-ENTMCNC: 31.8 G/DL (ref 31–37)
MCV RBC AUTO: 100.8 FL (ref 80–100)
MONOCYTES # BLD AUTO: 0.72 X10(3) UL (ref 0.1–1)
MONOCYTES NFR BLD AUTO: 9.3 %
NEUTROPHILS # BLD AUTO: 5.32 X10 (3) UL (ref 1.5–7.7)
NEUTROPHILS # BLD AUTO: 5.32 X10(3) UL (ref 1.5–7.7)
NEUTROPHILS NFR BLD AUTO: 68.9 %
OSMOLALITY SERPL CALC.SUM OF ELEC: 289 MOSM/KG (ref 275–295)
PLATELET # BLD AUTO: 125 10(3)UL (ref 150–450)
POTASSIUM SERPL-SCNC: 3.4 MMOL/L (ref 3.5–5.1)
POTASSIUM SERPL-SCNC: 3.5 MMOL/L (ref 3.5–5.1)
RBC # BLD AUTO: 3.68 X10(6)UL (ref 3.8–5.8)
SODIUM SERPL-SCNC: 137 MMOL/L (ref 136–145)
WBC # BLD AUTO: 7.7 X10(3) UL (ref 4–11)

## 2020-02-09 RX ORDER — GABAPENTIN 300 MG/1
300 CAPSULE ORAL 3 TIMES DAILY
Status: DISCONTINUED | OUTPATIENT
Start: 2020-02-09 | End: 2020-02-12

## 2020-02-09 NOTE — PLAN OF CARE
Assumed care at 0700. Pt A/O x4. RA. NSR on tele. VSS. Spoke with Dr. Joe Harper regarding BP parameters. New BP parameters to keep SBP between 120-180. Dr. Joe Harper is aware that pt SBP was in the 100s throughout the night and this morning.  Neuro checks q4, parameters to optimize cerebral perfusion and minimize risk of hemorrhage  - Monitor temperature, glucose, and sodium.  Initiate appropriate interventions as ordered  Outcome: Progressing  Goal: Absence of seizures  Description  INTERVENTIONS  - Monitor for

## 2020-02-09 NOTE — PLAN OF CARE
Assumed care at [address]. Patient A&Ox3 with forgetfulness. Tele SR/ST, on room air. Dobhoff with TF, increasing as ordered. Goal 55 ml/hr. Cont to c/o sciatic pain 10/10, PRN dilaudid with little relief. Hospitalist notified, norco added.    Neuros q 4,

## 2020-02-09 NOTE — OCCUPATIONAL THERAPY NOTE
OCCUPATIONAL THERAPY EVALUATION - INPATIENT     Room Number: 0681/2862-D  Evaluation Date: 2/9/2020  Type of Evaluation: Initial  Presenting Problem: Acute ischemic CVA with tPA, L sided weakness    Physician Order: IP Consult to Occupational Therapy  Reas Performed by Hua Ayoub MD at [address] [place] [address] N/A 9/15/2014    Performed by Hua Ayoub MD at [address] [place] [address] N/A 8/8/2014    Performed by Hua Ayoub MD at [facility] 0/5    COORDINATION  Gross Motor    impaired L UE   Fine Motor    impaired L UE          ACTIVITY TOLERANCE           BP: 156/57  BP Location: Left arm  BP Method: Automatic  Patient Position: Lying      ACTIVITIES OF DAILY LIVING ASSESSMENT  AM-PAC ‘6-Cli questions and concerns addressed; Family present    ASSESSMENT     Patient is a 78year old male admitted on 2/6/2020 for Acute ischemic CVA with tPA, L sided weakness. Complete medical history and occupational profile noted above.  Functional outcome measur education;Patient/Family training; Compensatory technique education; Fine motor coordination activities  Rehab Potential : Good     Number of Visits to Meet Established Goals: 7    ADL Goals   Patient will perform grooming: with max assist  Patient will perf

## 2020-02-09 NOTE — PROGRESS NOTES
[facility] Cardiology Progress Note - Pavan Killer Patient Status:  Inpatient    1940 MRN [id number]   [facility] 7NE-A Attending Criselda Diss, *   Hosp Day # 2 PCP Maninder Garcia MD     Subjective:  Res effort. Abdomen: Soft, non-tender. No organosplenomegally, mass or rebound, BS-present. Extremities: Without clubbing, cyanosis or edema. Peripheral pulses are 2+. Neurologic: Alert and oriented, normal affect. No motor or coordinational deficit.   Skin mg/250 ml premix infusion SOLN, 100-200 mcg/min, Intravenous, Continuous PRN  atorvastatin (LIPITOR) tab 80 mg, 80 mg, Oral, Nightly  aspirin 300 MG rectal suppository 300 mg, 300 mg, Rectal, Daily    Or  aspirin tab 325 mg, 325 mg, Oral, Daily  Senna (SEN denies nasal congestion, sinus pain; hearing loss negative  Respiratory: denies shortness of breath, wheezing or cough   Cardiovascular:  See HPI  GI: denies nausea, vomiting,   Genital/: no dysuria,   Musculoskeletal: no joint complaints upper or lower

## 2020-02-09 NOTE — PLAN OF CARE
NURSING TRANSFER NOTE      Patient admitted via bed from CCU  Report received from [facility], [address] [facility]  Oriented to room. Safety precautions initiated. Bed in low position. Call light in reach.   TF started  Patient updated on plan of care

## 2020-02-09 NOTE — CONSULTS
Redgie Hodgkins is a 78year old male.    Patient presents with:  Neurologic Problem    HPI:    dmg neuro consult for stroke  Pt s/p TPA, tx from n ICU to floor    Pt to ER on 2/7 with acute left sided weakness and facial droop  Taken to ER after 2 hrs of s EPIDURAL;  Surgeon: Radha Grimes MD;  Location: [address] [place] [address] Dr PAIN MANAGEMENT   • Fluor gid & loclzj ndl/cath spi dx/ther njx  9/15/2014    Procedure: ;  Surgeon: Radha Grimes MD;  Location: [facility] FOR PAIN MANAGEMENT   • Fluor gid & loclzj ndl/ unilateral  2/7/2020        • Ir intra arterial stroke intervention  2/7/2020        • Other surgical history      hand surgery right tendon   • Other surgical history  1/16/2013    right hip ORIF of intertrochanteric fx by Dr. Deepthi Martin   • Other surgical his MD;  Location: [facility] FOR [facility]NAGEMENT   • Patient withough preoperative order for iv antibiotic surgical site infection prophylaxis.  N/A 8/8/2014    Procedure: LUMBAR EPIDURAL;  Surgeon: Laurita Peace MD;  Location: [address] [place] [address] ([facility] of Radiology) NRDR ([address]) which includes the Dose Index   Registry.      FINDINGS:       Mild to moderate densely calcified atherosclerosis within the cavernous and supraclinoid segments of the internal carotid stenosis within the distal right subclavian artery beyond the origin of the right subclavian artery. Moderate stenosis seen at the left common carotid artery origin. Mild to moderate intracranial atherosclerosis.   Moderate to   severe stenosis is seen at subcortical and deep periventricular white matter are noted. Moderate mucoperiosteal thickening of the paranasal sinuses.      The expected major intracranial flow voids are present.     =====  CONCLUSION:       1. Scattered regions of acute infarcts th B COMPLEX) Oral Tab, Take 1 tablet by mouth daily. , Disp: , Rfl:   Fluticasone Propionate 50 MCG/ACT Nasal Suspension, by Each Nare route daily. , Disp: , Rfl:   Cetirizine HCl (ZYRTEC ALLERGY OR), Take by mouth., Disp: , Rfl:   Vitamin D3 (VITAMIN D3) 1000 significant intracranial and extracranial atherosclerotic disease and echo shows no PFO  No evidence of DVT  No evidence of PAF  Plan to MJ when seen and bed available  2) right sciatica.  Increase gabapentin to 300 tid  Add lido/menthol patch 2 to lumbar

## 2020-02-09 NOTE — PROGRESS NOTES
[facility] Hospitalist Progress Note     Davie Tejeda Patient Status:  Inpatient    1940 MRN [id number]   [facility] 7NE-A Attending Rossy Chen, *   Hosp Day # 1 PCP Deena Milton MD     CC: follow up    SUBJECTIVE:  Sitting u Intravenous Daily   • folic acid  1 mg Oral Daily   • Vitamin B-1  100 mg Oral Daily   • multivitamin  1 tablet Oral Daily   • gabapentin  300 mg Oral BID   • piperacillin-tazobactam  3.375 g Intravenous Q8H     Continuous Infusing Medication:  • sodium ch

## 2020-02-09 NOTE — PROGRESS NOTES
[facility] Hospitalist Progress Note     Brock Garrett Patient Status:  Inpatient    1940 MRN [id number]   [facility] 7NE-A Attending Marguerite Melendez, *   Hosp Day # 2 PCP Isauro Jimenez MD     CC: follow up    SUBJECTIVE:  Sitting u Clopidogrel Bisulfate  75 mg Oral Daily   • lisinopril  5 mg Oral Daily   • Heparin Sodium (Porcine)  5,000 Units Subcutaneous 2 times per day   • docusate sodium  100 mg Oral BID    Or   • docusate sodium  100 mg Per NG Tube BID   • atorvastatin  80 mg Or PNA    **chronic LBP, sciatica  - apprec neuro recs - increased gabapentin, added lidoderm patches    Prophylaxis:   DVT with SCDs    Dispo: ok to transfer to CTU from hospitalist standpoint    Margarita Buckley MD   [facility] Hospitalist  [phone]

## 2020-02-09 NOTE — SLP NOTE
Attempted to see pt this afternoon. Pt occupied with MD.  SLP will re-attempt later today as schedule permits. Ongoing assess for VFSS candidacy. Daniel Crain M.S.,CCC-SLP  Speech Language Pathologist

## 2020-02-09 NOTE — CM/SW NOTE
02/09/20 1500   CM/SW Referral Data   Referral Source Physician   Reason for Referral Discharge planning   Informant Spouse   Patient Info   Patient's Home Environment Condo/Apt with elevator   Number of Levels in Home 1   Patient lives with Spouse   Pa

## 2020-02-09 NOTE — PLAN OF CARE
See neuro documentation. O2 stable on RA  SR w/ BBB. Voiding via condom cath. Dobhoff place- TF start endorsed to [facility]. Smear of BM  Up to chair x2 w/ total lift.    SCD    Problem: Impaired Swallowing  Goal: Minimize aspiration risk  Description  Int Progressing

## 2020-02-10 LAB
ANION GAP SERPL CALC-SCNC: 4 MMOL/L (ref 0–18)
BASOPHILS # BLD AUTO: 0.04 X10(3) UL (ref 0–0.2)
BASOPHILS NFR BLD AUTO: 0.6 %
BUN BLD-MCNC: 20 MG/DL (ref 7–18)
BUN/CREAT SERPL: 24.4 (ref 10–20)
CALCIUM BLD-MCNC: 8.3 MG/DL (ref 8.5–10.1)
CHLORIDE SERPL-SCNC: 111 MMOL/L (ref 98–112)
CO2 SERPL-SCNC: 25 MMOL/L (ref 21–32)
CREAT BLD-MCNC: 0.82 MG/DL (ref 0.7–1.3)
DEPRECATED RDW RBC AUTO: 47.3 FL (ref 35.1–46.3)
EOSINOPHIL # BLD AUTO: 0.65 X10(3) UL (ref 0–0.7)
EOSINOPHIL NFR BLD AUTO: 9.7 %
ERYTHROCYTE [DISTWIDTH] IN BLOOD BY AUTOMATED COUNT: 12.8 % (ref 11–15)
GLUCOSE BLD-MCNC: 113 MG/DL (ref 70–99)
GLUCOSE BLD-MCNC: 116 MG/DL (ref 70–99)
GLUCOSE BLD-MCNC: 122 MG/DL (ref 70–99)
GLUCOSE BLD-MCNC: 126 MG/DL (ref 70–99)
GLUCOSE BLD-MCNC: 95 MG/DL (ref 70–99)
HCT VFR BLD AUTO: 36.1 % (ref 39–53)
HGB BLD-MCNC: 11.7 G/DL (ref 13–17.5)
IMM GRANULOCYTES # BLD AUTO: 0.02 X10(3) UL (ref 0–1)
IMM GRANULOCYTES NFR BLD: 0.3 %
LYMPHOCYTES # BLD AUTO: 1.48 X10(3) UL (ref 1–4)
LYMPHOCYTES NFR BLD AUTO: 22 %
MCH RBC QN AUTO: 32.4 PG (ref 26–34)
MCHC RBC AUTO-ENTMCNC: 32.4 G/DL (ref 31–37)
MCV RBC AUTO: 100 FL (ref 80–100)
MONOCYTES # BLD AUTO: 0.67 X10(3) UL (ref 0.1–1)
MONOCYTES NFR BLD AUTO: 10 %
NEUTROPHILS # BLD AUTO: 3.86 X10 (3) UL (ref 1.5–7.7)
NEUTROPHILS # BLD AUTO: 3.86 X10(3) UL (ref 1.5–7.7)
NEUTROPHILS NFR BLD AUTO: 57.4 %
OSMOLALITY SERPL CALC.SUM OF ELEC: 294 MOSM/KG (ref 275–295)
PLATELET # BLD AUTO: 135 10(3)UL (ref 150–450)
POTASSIUM SERPL-SCNC: 3.7 MMOL/L (ref 3.5–5.1)
RBC # BLD AUTO: 3.61 X10(6)UL (ref 3.8–5.8)
SODIUM SERPL-SCNC: 140 MMOL/L (ref 136–145)
WBC # BLD AUTO: 6.7 X10(3) UL (ref 4–11)

## 2020-02-10 NOTE — PHYSICAL THERAPY NOTE
PHYSICAL THERAPY TREATMENT NOTE - INPATIENT    Room Number: 4610/4102-X     Session: 1   Number of Visits to Meet Established Goals: 10    Presenting Problem: Acute ischemic CVA with tPA, L sided weakness    Problem List  Principal Problem:    Acute ische Right 8/8/2016    Performed by Michelle Lynch MD at [address]   • TRANSFORAMINAL EPIDURAL - LUMBAR Right 3/9/2016    Performed by Michelle Lynch MD at [facility]  \"I am just so tired\"    Patient term/long term goals for optimal functional independence and safety.     Transfers    Supine<>Sit: Max A x 2  Sit<>Stand: NT  Transfers: snow   Gait: See above flow sheet  Chair Follow: NA  Sit<>Supine: Max A x 2  Stand<>Sit: NT    Comments related to [facility] training;Neuromuscular re-educate;Range of motion;Strengthening;Transfer training;Balance training  Rehab Potential : Good  Frequency (Obs): Daily    CURRENT GOALS     Goal #1 Patient is able to demonstrate supine - sit EOB @ level: moderate assistance

## 2020-02-10 NOTE — PROGRESS NOTES
NEUROLOGY PROGRESS NOTE     SUBJECTIVE:     Interval History: No events reported overnight. No new neurological symptoms reported.     OBJECTIVE   Temp:  [97.6 °F (36.4 °C)-98.8 °F (37.1 °C)] 97.7 °F (36.5 °C)  Pulse:  [] 79  Resp:  [12-22] 15  BP: (1 Oral Daily   • Senna  17.2 mg Oral Nightly   • famoTIDine  20 mg Oral Daily    Or   • famoTIDine  20 mg Intravenous Daily   • folic acid  1 mg Oral Daily   • Vitamin B-1  100 mg Oral Daily   • multivitamin  1 tablet Oral Daily   • piperacillin-tazobactam parietal lobe may represent subtle petechial hemorrhage in this location. 5. Global parenchymal volume loss is noted. Echocardiogram (2/7/2020):  1. Left ventricle:  The cavity size was normal. Wall thickness was normal.     The estimated ejection fract

## 2020-02-10 NOTE — PLAN OF CARE
Assumed care at 0700. Pt A/O x4. RA. NSR on tele. VSS. Neuro checks q4, see flow sheets. NIH 16. NGT in place, no complaints. Meds given per NGT. Jevity 1.5 infusing at 55ml/hr (goal) with 165ml/hr water flushes q4. Complaining of R leg pain.  PRN Norco and seizure activity  - Administer anti-seizure medications as ordered  - Monitor neurological status  Outcome: Progressing  Goal: Remains free of injury related to seizure activity  Description  INTERVENTIONS:  - Maintain airway, patient safety  and administe level and precautions during self-care  - Provide support under elbow of weak side to prevent shoulder subluxation  - Encourage patient to incorporate impaired side during daily activities to promote function   Outcome: Progressing

## 2020-02-10 NOTE — PROGRESS NOTES
[facility] Hospitalist Progress Note     Floridalma Orosco Patient Status:  Inpatient    1940 MRN [id number]   [facility] [place] 7NE-A Attending Khalif Franco, *   Hosp Day # 3 PCP Samantha Patrick MD     CC: follow up    SUBJECTIVE:  Pt seen a 02/10/20  0608 02/10/20  1215   PGLU 103* 123* 113* 116* 126*           Meds:   Scheduled Medication:  • gabapentin  300 mg Oral TID   • lidocaine-menthol  2 patch Transdermal Daily   • Clopidogrel Bisulfate  75 mg Oral Daily   • lisinopril  5 mg Oral Kentrell wife, pt does not follow with cardiology  -cards consult, appreciated  -echo noted  -tele    **GERD-Stable.  Continue home meds  **depression-stable, monitor    **chest congestion/cough  -possible RUL PNA on 2/7 CXR  -cont empiric zoysn pending sputum cx

## 2020-02-10 NOTE — PROGRESS NOTES
02/10/20 0954   Clinical Encounter Type   Visited With Patient and family together  (Wife at bedside)   Routine Visit Introduction  (AD requested and completed.   Original to pt and copy to chart)   Patient's Supportive Strategies/Resources  prov

## 2020-02-10 NOTE — PLAN OF CARE
Problem: Impaired Swallowing  Goal: Minimize aspiration risk  Description  Interventions:  - NPO  - Oral Care   Outcome: Progressing   Recommend VFSS to further assess extent of dysphagia. Agree with rec for acute rehab for PT/OT/ST interventions.

## 2020-02-10 NOTE — CONSULTS
.Stephanie Rolle Patient Status:  Inpatient    1940 MRN [id number]   [facility] 7NE-A Attending Renato Flower, *   Hosp Day # 3 PCP Becki Phillips MD     Patient Identification  Brie Calderón is a 78 year ol 65-70%. 2. Right ventricle: The cavity size was normal. Systolic function was     normal.  3. Pulmonary arteries: Systolic pressure was at the upper limits of normal,     estimated to be 32mm Hg.  Estimated pulmonary artery diastolic pressure     was 5mm H MD at [address] [place] [address] 3 LEVEL N/A 4/27/2015    Performed by Jose Quinonez MD at [facility] MAIN OR   • OTHER SURGICAL HISTORY      hand surgery right tendon   • OTHER SURGICAL HISTORY  1/16/2013    right hip ORIF of intertro MG rectal suppository 650 mg, 650 mg, Rectal, Q4H PRN  [COMPLETED] Labetalol HCl (TRANDATE) injection 10 mg, 10 mg, Intravenous, Once PRN  [COMPLETED] ALTEPLASE (TPA) STROKE BOLUS FROM BAG 8.5 mg infusion, 0.09 mg/kg, Intravenous, Once    Followed by  Tessa Almeida Or  morphINE sulfate (PF) 4 MG/ML injection 2 mg, 2 mg, Intravenous, Q2H PRN  HYDROmorphone HCl (DILAUDID) 1 MG/ML injection 0.2 mg, 0.2 mg, Intravenous, Q2H PRN  Piperacillin Sod-Tazobactam So (ZOSYN) 3.375 g in dextrose 5 % 100 mL ADD-vantage, 3.375 g, I clear. PERRL, EOMs intact. Vision functional.   Ears/Nose/Throat: Hearing intact. Lips, mucosa, and tongue normal. Teeth and gums normal. Moist mucous membranes. Neck: No neck masses or thyroid enlargement/tenderness/nodules.        Lungs:   Resonant, c oversight to monitor kidney function, cardiac function, pulmonary status, neurologic status, DVT prevention. Estimated length of stay in recommended rehabilitation level of care is 3 weeks.       The patient has fair potential to achieve the

## 2020-02-10 NOTE — SLP NOTE
SPEECH DAILY NOTE - INPATIENT    ASSESSMENT & PLAN   ASSESSMENT  Patient seen at bedside for ongoing dysphagia assessment for VFSS readiness.  Pt in seated upright position however head/neck leaning to left sided, despite towel rolls for positioning assista further assess extent of dysphagia New     FOLLOW UP  Follow Up Needed: Yes  SLP Follow-up Date: 02/10/20  Number of Visits to Meet Established Goals: 7    Session: 2    If you have any questions, please contact Romana Hench, MS CCC-SLP/RAMSES pa

## 2020-02-10 NOTE — PROGRESS NOTES
[facility] Cardiology  Progress Note    Vladimir Arnold Patient Status:  Inpatient    1940 MRN [id number]   [facility] 7NE-A Attending Angélica Mead, *   Hosp Day # 3 PCP Daya Hi MD       Subjective:        Card Lab 02/07/20  0020 02/07/20  0024 02/08/20  0434 02/09/20  0655 02/09/20  0914 02/10/20  0549     --  140  --  137 140   K 4.1  --  3.4* 3.5 3.4* 3.7     --  109  --  109 111   CO2 28.0  --  26.0  --  26.0 25.0   BUN 15  --  12  --  21* 20* admission since last ECG in Epic, 2015  Stable without angina    Dispo: Full code. Concerns regarding plan of care were discussed with patient and his wife at bedside. Patient agrees with plan as detailed above.  Discussed plan of care with Dr. Wilson Other

## 2020-02-11 ENCOUNTER — APPOINTMENT (OUTPATIENT)
Dept: GENERAL RADIOLOGY | Facility: HOSPITAL | Age: 80
DRG: 061 | End: 2020-02-11
Attending: HOSPITALIST
Payer: MEDICARE

## 2020-02-11 LAB
ANION GAP SERPL CALC-SCNC: 3 MMOL/L (ref 0–18)
BASOPHILS # BLD AUTO: 0.05 X10(3) UL (ref 0–0.2)
BASOPHILS NFR BLD AUTO: 0.8 %
BUN BLD-MCNC: 14 MG/DL (ref 7–18)
BUN/CREAT SERPL: 21.9 (ref 10–20)
CALCIUM BLD-MCNC: 8.1 MG/DL (ref 8.5–10.1)
CHLORIDE SERPL-SCNC: 112 MMOL/L (ref 98–112)
CO2 SERPL-SCNC: 27 MMOL/L (ref 21–32)
CREAT BLD-MCNC: 0.64 MG/DL (ref 0.7–1.3)
DEPRECATED RDW RBC AUTO: 46.8 FL (ref 35.1–46.3)
EOSINOPHIL # BLD AUTO: 0.6 X10(3) UL (ref 0–0.7)
EOSINOPHIL NFR BLD AUTO: 10 %
ERYTHROCYTE [DISTWIDTH] IN BLOOD BY AUTOMATED COUNT: 12.9 % (ref 11–15)
GLUCOSE BLD-MCNC: 101 MG/DL (ref 70–99)
GLUCOSE BLD-MCNC: 111 MG/DL (ref 70–99)
GLUCOSE BLD-MCNC: 90 MG/DL (ref 70–99)
GLUCOSE BLD-MCNC: 92 MG/DL (ref 70–99)
HCT VFR BLD AUTO: 35.1 % (ref 39–53)
HGB BLD-MCNC: 11.6 G/DL (ref 13–17.5)
IMM GRANULOCYTES # BLD AUTO: 0.02 X10(3) UL (ref 0–1)
IMM GRANULOCYTES NFR BLD: 0.3 %
LYMPHOCYTES # BLD AUTO: 1.14 X10(3) UL (ref 1–4)
LYMPHOCYTES NFR BLD AUTO: 19 %
MCH RBC QN AUTO: 32.6 PG (ref 26–34)
MCHC RBC AUTO-ENTMCNC: 33 G/DL (ref 31–37)
MCV RBC AUTO: 98.6 FL (ref 80–100)
MONOCYTES # BLD AUTO: 0.65 X10(3) UL (ref 0.1–1)
MONOCYTES NFR BLD AUTO: 10.9 %
NEUTROPHILS # BLD AUTO: 3.53 X10 (3) UL (ref 1.5–7.7)
NEUTROPHILS # BLD AUTO: 3.53 X10(3) UL (ref 1.5–7.7)
NEUTROPHILS NFR BLD AUTO: 59 %
OSMOLALITY SERPL CALC.SUM OF ELEC: 295 MOSM/KG (ref 275–295)
PLATELET # BLD AUTO: 137 10(3)UL (ref 150–450)
POTASSIUM SERPL-SCNC: 3.8 MMOL/L (ref 3.5–5.1)
RBC # BLD AUTO: 3.56 X10(6)UL (ref 3.8–5.8)
SODIUM SERPL-SCNC: 142 MMOL/L (ref 136–145)
WBC # BLD AUTO: 6 X10(3) UL (ref 4–11)

## 2020-02-11 PROCEDURE — 74230 X-RAY XM SWLNG FUNCJ C+: CPT | Performed by: HOSPITALIST

## 2020-02-11 RX ORDER — ASPIRIN 325 MG
325 TABLET ORAL DAILY
Qty: 30 TABLET | Refills: 0 | Status: SHIPPED | COMMUNITY
Start: 2020-02-12

## 2020-02-11 RX ORDER — POTASSIUM CHLORIDE 1.5 G/1.77G
40 POWDER, FOR SOLUTION ORAL ONCE
Status: COMPLETED | OUTPATIENT
Start: 2020-02-11 | End: 2020-02-11

## 2020-02-11 RX ORDER — PSEUDOEPHEDRINE HCL 30 MG
100 TABLET ORAL 2 TIMES DAILY
Qty: 30 CAPSULE | Refills: 0 | Status: SHIPPED | COMMUNITY
Start: 2020-02-11 | End: 2020-11-28

## 2020-02-11 RX ORDER — GABAPENTIN 300 MG/1
600 CAPSULE ORAL 3 TIMES DAILY
Qty: 60 CAPSULE | Refills: 0 | Status: SHIPPED | COMMUNITY
Start: 2020-02-11 | End: 2020-04-09

## 2020-02-11 RX ORDER — LISINOPRIL 5 MG/1
5 TABLET ORAL DAILY
Qty: 30 PACKAGE | Refills: 0 | Status: SHIPPED | COMMUNITY
Start: 2020-02-12 | End: 2020-04-09

## 2020-02-11 RX ORDER — DOXEPIN HYDROCHLORIDE 50 MG/1
1 CAPSULE ORAL DAILY
Qty: 30 TABLET | Refills: 0 | Status: SHIPPED | COMMUNITY
Start: 2020-02-12 | End: 2020-11-28

## 2020-02-11 RX ORDER — AMOXICILLIN AND CLAVULANATE POTASSIUM 875; 125 MG/1; MG/1
1 TABLET, FILM COATED ORAL 2 TIMES DAILY
Qty: 6 TABLET | Refills: 0 | Status: SHIPPED | COMMUNITY
Start: 2020-02-12 | End: 2020-03-05

## 2020-02-11 RX ORDER — FAMOTIDINE 20 MG/1
20 TABLET ORAL DAILY
Qty: 30 TABLET | Refills: 0 | Status: SHIPPED | COMMUNITY
Start: 2020-02-12 | End: 2020-04-09

## 2020-02-11 RX ORDER — MELATONIN
100 DAILY
Qty: 30 TABLET | Refills: 0 | Status: SHIPPED | COMMUNITY
Start: 2020-02-12 | End: 2020-04-09

## 2020-02-11 RX ORDER — POLYETHYLENE GLYCOL 3350 17 G/17G
17 POWDER, FOR SOLUTION ORAL DAILY PRN
Qty: 30 EACH | Refills: 0 | Status: SHIPPED | COMMUNITY
Start: 2020-02-11 | End: 2020-04-09

## 2020-02-11 RX ORDER — HYDROCODONE BITARTRATE AND ACETAMINOPHEN 5; 325 MG/1; MG/1
1-2 TABLET ORAL EVERY 6 HOURS PRN
Qty: 20 TABLET | Refills: 0 | Status: SHIPPED | OUTPATIENT
Start: 2020-02-11 | End: 2020-02-16

## 2020-02-11 RX ORDER — ATORVASTATIN CALCIUM 80 MG/1
80 TABLET, FILM COATED ORAL NIGHTLY
Qty: 30 TABLET | Refills: 0 | Status: SHIPPED | COMMUNITY
Start: 2020-02-11 | End: 2020-04-09

## 2020-02-11 RX ORDER — CLOPIDOGREL BISULFATE 75 MG/1
75 TABLET ORAL DAILY
Status: DISCONTINUED | OUTPATIENT
Start: 2020-02-11 | End: 2020-02-12

## 2020-02-11 RX ORDER — FOLIC ACID 1 MG/1
1 TABLET ORAL DAILY
Qty: 30 TABLET | Refills: 0 | Status: SHIPPED | COMMUNITY
Start: 2020-02-12 | End: 2020-04-09

## 2020-02-11 NOTE — PLAN OF CARE
Assumed care at [address]. AOx3 forgetful at times. C/o generalized pain. Pain meds given. C/o SOB. Pt's O2 was 97% room air. Explained to pt can be due to NG tube. Yankeuer suction offered multiple times. O2 stays 97-99%. Pt requested to have O2.  O2 1L appl seizures  Description  INTERVENTIONS  - Monitor for seizure activity  - Administer anti-seizure medications as ordered  - Monitor neurological status  Outcome: Progressing  Goal: Remains free of injury related to seizure activity  Description  INTERVENTION during self-care  - Provide support under elbow of weak side to prevent shoulder subluxation  - Encourage patient to incorporate impaired side during daily activities to promote function   Outcome: Progressing

## 2020-02-11 NOTE — PROGRESS NOTES
[facility] Hospitalist Progress Note     Jesseniasarah Venegas Patient Status:  Inpatient    1940 MRN [id number]   [facility] 7NE-A Attending Renan Anand, *   Hosp Day # 4 PCP Vj Horvath MD     CC: follow up    SUBJECTIVE:  Pt seen a Lab 02/10/20  0608 02/10/20  1215 02/10/20  1805 02/11/20  0000 02/11/20  0625   PGLU 116* 126* 95 111* 92           Meds:   Scheduled Medication:  • Clopidogrel Bisulfate  75 mg Oral Daily   • gabapentin  300 mg Oral TID   • lidocaine-menthol  2 patch T appreciated  -echo noted  -tele    **GERD-Stable.  Continue home meds  **depression-stable, monitor    **chest congestion/cough  -possible RUL PNA on 2/7 CXR  -cont empiric zoysn pending sputum cx, can likely change to augmentin on discharge     **chronic L

## 2020-02-11 NOTE — DIETARY NOTE
[facility]    NUTRITION ASSESSMENT    Pt does not meet malnutrition criteria.     NUTRITION DIAGNOSIS/PROBLEM:    Inadequate oral intake related to decreased ability to consume sufficient energy intake (CVA) as evidenced by NPO status per SLP and consu Allergies: No  Cultural/Ethnic/Advent Preferences Addresses: Yes    NUTRITION RELATED PHYSICAL FINDINGS:  Pt appears well nourished  Lower & Upper Extremity Edema noted    NUTRITION PRESCRIPTION:89.2 kg  Calories: 4151-6352 calories/day (23-25 calories

## 2020-02-11 NOTE — PLAN OF CARE
Received patient today alert and oriented x3 and having minimal pain. Patient went for video swallow today and passed with NTL and pureed diet. Notified Dr. Cameron Horn about this and received orders for a diet and to remove dobhoff.  Patient tolerated his diet

## 2020-02-11 NOTE — CM/SW NOTE
[facility] accepted pt for acute rehab. Pt will be ready for d/c tomorrow. Nancy at [place] [address] says she has a bed for pt tomorrow. Pt will go into Room 1163. RN to call report to [phone]. Pt will need an ambulance for transport. Will set up d/c time tomorrow.

## 2020-02-11 NOTE — PROGRESS NOTES
NEUROLOGY PROGRESS NOTE     SUBJECTIVE:     Interval History: No events reported overnight. No new neurological symptoms reported.     OBJECTIVE   Temp:  [97.6 °F (36.4 °C)-98.3 °F (36.8 °C)] 98.2 °F (36.8 °C)  Pulse:  [] 96  Resp:  [15-18] 18  BP: (1 atorvastatin  80 mg Oral Nightly   • aspirin  300 mg Rectal Daily    Or   • aspirin  325 mg Oral Daily   • Senna  17.2 mg Oral Nightly   • famoTIDine  20 mg Oral Daily    Or   • famoTIDine  20 mg Intravenous Daily   • folic acid  1 mg Oral Daily   • Vitami represent subtle petechial hemorrhage in this location. 5. Global parenchymal volume loss is noted. Echocardiogram (2/7/2020):  1. Left ventricle: The cavity size was normal. Wall thickness was normal.     The estimated ejection fraction was 65-70%.   2

## 2020-02-11 NOTE — VIDEO SWALLOW STUDY NOTE
ADULT VIDEOFLUOROSCOPIC SWALLOWING STUDY    Admission Date: 2/6/2020  Evaluation Date: 02/11/20  Radiologist: Dr. Jostin Yung   Diet Recommendations - Solids: Puree   Diet Recommendations - Liquid: Nectar thick  *Compensatory Strategies Rec oropharyngeal swallow function and assess for compensatory strategies to improve safe swallow function. THIN LIQUIDS  Method of Presentation: Teaspoon;Straw  Oral Phase of Swallow (VFSS - Thin Liquids): Impaired  Bolus Retrieval (VFSS - Thin Liquids):  I None             Overall Impression: Mild/Moderate to Moderate Oropharyngeal Dysphagia characterized by anterior spillage of thin liquid trials, decreased bolus control/formation, and mild to mild/moderate BOT and vallecular residuals with thin and NTL tri satisfaction. INTERDISCIPLINARY COMMUNICATION  Reviewed results with Radiologist; agreement verbalized.     FOLLOW UP  Treatment Plan/Recommendations: Dysphagia therapy;Communication evaluation;Aspiration precautions  Number of Visits to Meet Established

## 2020-02-12 ENCOUNTER — HOSPITAL ENCOUNTER (OUTPATIENT)
Dept: CV DIAGNOSTICS | Facility: HOSPITAL | Age: 80
Discharge: HOME | DRG: 061 | End: 2020-02-12
Attending: Other
Payer: MEDICARE

## 2020-02-12 VITALS
RESPIRATION RATE: 15 BRPM | WEIGHT: 196.63 LBS | HEART RATE: 64 BPM | SYSTOLIC BLOOD PRESSURE: 148 MMHG | TEMPERATURE: 98 F | OXYGEN SATURATION: 94 % | BODY MASS INDEX: 31 KG/M2 | DIASTOLIC BLOOD PRESSURE: 57 MMHG

## 2020-02-12 DIAGNOSIS — I63.9 ACUTE ISCHEMIC STROKE (HCC): ICD-10-CM

## 2020-02-12 LAB
ANION GAP SERPL CALC-SCNC: 2 MMOL/L (ref 0–18)
BUN BLD-MCNC: 10 MG/DL (ref 7–18)
BUN/CREAT SERPL: 16.1 (ref 10–20)
CALCIUM BLD-MCNC: 8.1 MG/DL (ref 8.5–10.1)
CHLORIDE SERPL-SCNC: 112 MMOL/L (ref 98–112)
CO2 SERPL-SCNC: 26 MMOL/L (ref 21–32)
CREAT BLD-MCNC: 0.62 MG/DL (ref 0.7–1.3)
GLUCOSE BLD-MCNC: 111 MG/DL (ref 70–99)
GLUCOSE BLD-MCNC: 98 MG/DL (ref 70–99)
GLUCOSE BLD-MCNC: 99 MG/DL (ref 70–99)
HAV IGM SER QL: 1.9 MG/DL (ref 1.6–2.6)
OSMOLALITY SERPL CALC.SUM OF ELEC: 289 MOSM/KG (ref 275–295)
POTASSIUM SERPL-SCNC: 3.8 MMOL/L (ref 3.5–5.1)
SODIUM SERPL-SCNC: 140 MMOL/L (ref 136–145)

## 2020-02-12 PROCEDURE — 93270 REMOTE 30 DAY ECG REV/REPORT: CPT | Performed by: OTHER

## 2020-02-12 PROCEDURE — 93271 ECG/MONITORING AND ANALYSIS: CPT | Performed by: OTHER

## 2020-02-12 PROCEDURE — 93272 ECG/REVIEW INTERPRET ONLY: CPT | Performed by: OTHER

## 2020-02-12 RX ORDER — POTASSIUM CHLORIDE 20 MEQ/1
40 TABLET, EXTENDED RELEASE ORAL ONCE
Status: COMPLETED | OUTPATIENT
Start: 2020-02-12 | End: 2020-02-12

## 2020-02-12 NOTE — DISCHARGE SUMMARY
General Medicine Discharge Summary     Patient ID:  Brock Garrett  78year old  7/21/1940    Admit date: 2/6/2020    Discharge date and time: 2/12/2020    Attending Physician: Celeste Wilkins DO Consults: IP CONSULT TO NEUROLOGY  IP CONSULT TO INTERVENTIONAL NEUROLOGY  IP CONSULT TO HOSPITALIST  IP CONSULT TO NEURO INTENSIVIST  IP CONSULT TO SOCIAL WORK  IP CONSULT TO PHYSICAL THERAPY  IP CONSULT TO OCCUPATIONAL THERAPY  IP CONSULT TO SPEECH L CHANGED    Amoxicillin-Pot Clavulanate 875-125 MG Oral Tab  Take 1 tablet by mouth 2 (two) times daily. Trospium Chloride ER 60 MG Oral Capsule SR 24 Hr  Take 1 capsule (60 mg total) by mouth daily.     Clopidogrel Bisulfate 75 MG Oral Tab  TAKE ONE TABL

## 2020-02-12 NOTE — CM/SW NOTE
Pt to go to [facility] today. [facility] is scheduled to  pt today at 12:30pm.   PCS form completed and placed on chart.

## 2020-02-12 NOTE — PLAN OF CARE
NURSING DISCHARGE NOTE    Discharged Rehab facility via Ambulance. Accompanied by Support staff  Belongings Taken by patient/family. Discharge report given to Gabriel Osullivan. All questions answered. Belongings taken by spouse.  Ambulance will take pt t

## 2020-02-12 NOTE — PLAN OF CARE
Assumed care at [address]. AOx3-4 forgetful at times. C/o left hip pain; pain meds given. Ate 50% of dinner. Pt tolerated well with puree and NTL. Continue on IV fluid/ abx. Plan for [facility] today. Plan of care discussed with pt. Call light within reach.     Pt sodium.  Initiate appropriate interventions as ordered  Outcome: Progressing  Goal: Absence of seizures  Description  INTERVENTIONS  - Monitor for seizure activity  - Administer anti-seizure medications as ordered  - Monitor neurological status  Outcome: Pr Educate and encourage patient/family in tolerated functional activity level and precautions during self-care  - Provide support under elbow of weak side to prevent shoulder subluxation  - Encourage patient to incorporate impaired side during daily activiti

## 2020-02-12 NOTE — PLAN OF CARE
Assumed care of pt @ 0730. Pt is A/Ox4, forgetful at times. VSS, on RA. Had 1L of O2 overnight for comfort. PRN Yankauer suction. Meds given crushed with applesauce. Neuro status unchanged and stable, NIH 14, Left side remains flaccid.  K+ replaced th is AM speech pathologist) if coughing or persistent throat clearing or wet/gurgly vocal quality is noted      Outcome: Adequate for Discharge     Problem: NEUROLOGICAL - ADULT  Goal: Achieves stable or improved neurological status  Description  INTERVENTIONS  - mobility/gait  Description  Interventions:  - Assess patient's functional ability and stability  - Promote increasing activity/tolerance for mobility and gait  - Educate and engage patient/family in tolerated activity level and precautions  - Recommend up

## 2020-02-12 NOTE — CM/SW NOTE
02/12/20 1400   Discharge disposition   Expected discharge disposition Rehab [address]   Name of Facillity/Home Care/Hospice [facility]   Patient is Discharged to a [address] [place] [address] Yes   Discharge transportation [employer]

## 2020-02-12 NOTE — PLAN OF CARE
Assumed care at [address]. AOx3-4 forgetful at times. C/o left hip pain; pain meds given. Ate 50% of dinner. Pt tolerated well with puree and NTL. Continue on IV fluid/ abx. Plan for [facility] today. Plan of care discussed with pt. Call light within reach. Monitor for seizure activity  - Administer anti-seizure medications as ordered  - Monitor neurological status  Outcome: Progressing  Goal: Remains free of injury related to seizure activity  Description  INTERVENTIONS:  - Maintain airway, patient safety  a elbow of weak side to prevent shoulder subluxation  - Encourage patient to incorporate impaired side during daily activities to promote function   Outcome: Progressing

## 2020-02-12 NOTE — OCCUPATIONAL THERAPY NOTE
OCCUPATIONAL THERAPY TREATMENT NOTE - INPATIENT     Room Number: 6710/9029-B  Session: 1   Number of Visits to Meet Established Goals: 7    Presenting Problem: Acute ischemic CVA with tPA, L sided weakness    History related to current admission:  Patient Gonzalo Sibley MD at [address] [place] [address] N/A 8/8/2014    Performed by Gonzalo Sibley MD at [address] [place] [address] N/A 10/21/2013    Performed by Gonzalo Sibley MD at [address] [place] [address] Provided: Pt was seen for PHQ 9. See below. Patient End of Session: In bed;Needs met;Call light within reach; Family present    ASSESSMENT   Pt was seen for PHQ 9. Therapist led pt to complete PHQ9, pt scored 4 on PHQ9.   Scores demonstrate:  0-4 - Min

## 2020-02-20 NOTE — PROGRESS NOTES
Debi'd a call from [address] [place] [address] today informing us that [facility] hasn't worn his monitor for 5 days now. Gave the company the phone number to [facility] since he was discharged to that facility.

## 2020-03-04 ENCOUNTER — EXTERNAL FACILITY (OUTPATIENT)
Dept: FAMILY MEDICINE CLINIC | Facility: CLINIC | Age: 80
End: 2020-03-04

## 2020-03-04 DIAGNOSIS — N40.1 BENIGN NON-NODULAR PROSTATIC HYPERPLASIA WITH LOWER URINARY TRACT SYMPTOMS: ICD-10-CM

## 2020-03-04 DIAGNOSIS — L89.620 PRESSURE INJURY OF BOTH HEELS, UNSTAGEABLE (HCC): ICD-10-CM

## 2020-03-04 DIAGNOSIS — G81.94 LEFT HEMIPARESIS (HCC): ICD-10-CM

## 2020-03-04 DIAGNOSIS — I10 ESSENTIAL HYPERTENSION, BENIGN: ICD-10-CM

## 2020-03-04 DIAGNOSIS — L89.610 PRESSURE INJURY OF BOTH HEELS, UNSTAGEABLE (HCC): ICD-10-CM

## 2020-03-04 DIAGNOSIS — I63.9 ACUTE ISCHEMIC STROKE (HCC): Primary | ICD-10-CM

## 2020-03-04 DIAGNOSIS — K21.9 GASTROESOPHAGEAL REFLUX DISEASE, ESOPHAGITIS PRESENCE NOT SPECIFIED: ICD-10-CM

## 2020-03-04 DIAGNOSIS — E78.2 MIXED HYPERLIPIDEMIA: ICD-10-CM

## 2020-03-04 PROCEDURE — 1111F DSCHRG MED/CURRENT MED MERGE: CPT | Performed by: FAMILY MEDICINE

## 2020-03-04 PROCEDURE — 99306 1ST NF CARE HIGH MDM 50: CPT | Performed by: FAMILY MEDICINE

## 2020-03-05 ENCOUNTER — INITIAL APN SNF VISIT (OUTPATIENT)
Dept: INTERNAL MEDICINE CLINIC | Age: 80
End: 2020-03-05

## 2020-03-05 ENCOUNTER — MOBILE ENCOUNTER (OUTPATIENT)
Dept: FAMILY MEDICINE CLINIC | Facility: CLINIC | Age: 80
End: 2020-03-05

## 2020-03-05 ENCOUNTER — NURSE ONLY (OUTPATIENT)
Dept: LAB | Age: 80
End: 2020-03-05
Attending: FAMILY MEDICINE
Payer: MEDICARE

## 2020-03-05 VITALS
TEMPERATURE: 97 F | SYSTOLIC BLOOD PRESSURE: 94 MMHG | HEART RATE: 58 BPM | OXYGEN SATURATION: 94 % | RESPIRATION RATE: 18 BRPM | DIASTOLIC BLOOD PRESSURE: 57 MMHG

## 2020-03-05 DIAGNOSIS — M48.062 SPINAL STENOSIS OF LUMBAR REGION WITH NEUROGENIC CLAUDICATION: ICD-10-CM

## 2020-03-05 DIAGNOSIS — Z86.73 PERSONAL HISTORY OF TRANSIENT CEREBRAL ISCHEMIA: Primary | ICD-10-CM

## 2020-03-05 DIAGNOSIS — L89.610 PRESSURE INJURY OF BOTH HEELS, UNSTAGEABLE (HCC): ICD-10-CM

## 2020-03-05 DIAGNOSIS — R26.9 GAIT ABNORMALITY: ICD-10-CM

## 2020-03-05 DIAGNOSIS — I63.9 ACUTE ISCHEMIC STROKE (HCC): Primary | ICD-10-CM

## 2020-03-05 DIAGNOSIS — S90.829D: ICD-10-CM

## 2020-03-05 DIAGNOSIS — R29.898 WEAKNESS OF BOTH LEGS: ICD-10-CM

## 2020-03-05 DIAGNOSIS — N39.41 URGE INCONTINENCE: ICD-10-CM

## 2020-03-05 DIAGNOSIS — I10 ESSENTIAL HYPERTENSION, BENIGN: ICD-10-CM

## 2020-03-05 DIAGNOSIS — K21.9 GASTROESOPHAGEAL REFLUX DISEASE, ESOPHAGITIS PRESENCE NOT SPECIFIED: ICD-10-CM

## 2020-03-05 DIAGNOSIS — E78.2 MIXED HYPERLIPIDEMIA: ICD-10-CM

## 2020-03-05 DIAGNOSIS — G81.94 LEFT HEMIPARESIS (HCC): ICD-10-CM

## 2020-03-05 DIAGNOSIS — L89.620 PRESSURE INJURY OF BOTH HEELS, UNSTAGEABLE (HCC): ICD-10-CM

## 2020-03-05 DIAGNOSIS — I44.7 LBBB (LEFT BUNDLE BRANCH BLOCK): ICD-10-CM

## 2020-03-05 DIAGNOSIS — N40.1 BENIGN NON-NODULAR PROSTATIC HYPERPLASIA WITH LOWER URINARY TRACT SYMPTOMS: ICD-10-CM

## 2020-03-05 DIAGNOSIS — Z92.82 RECEIVED INTRAVENOUS TISSUE PLASMINOGEN ACTIVATOR (TPA) IN EMERGENCY DEPARTMENT: ICD-10-CM

## 2020-03-05 LAB
ALBUMIN SERPL-MCNC: 2.8 G/DL (ref 3.4–5)
ALBUMIN/GLOB SERPL: 0.9 {RATIO} (ref 1–2)
ALP LIVER SERPL-CCNC: 72 U/L (ref 45–117)
ALT SERPL-CCNC: 27 U/L (ref 16–61)
ANION GAP SERPL CALC-SCNC: 4 MMOL/L (ref 0–18)
AST SERPL-CCNC: 21 U/L (ref 15–37)
BASOPHILS # BLD AUTO: 0.06 X10(3) UL (ref 0–0.2)
BASOPHILS NFR BLD AUTO: 0.7 %
BILIRUB SERPL-MCNC: 0.5 MG/DL (ref 0.1–2)
BUN BLD-MCNC: 9 MG/DL (ref 7–18)
BUN/CREAT SERPL: 13.6 (ref 10–20)
CALCIUM BLD-MCNC: 9.1 MG/DL (ref 8.5–10.1)
CHLORIDE SERPL-SCNC: 107 MMOL/L (ref 98–112)
CO2 SERPL-SCNC: 29 MMOL/L (ref 21–32)
CREAT BLD-MCNC: 0.66 MG/DL (ref 0.7–1.3)
DEPRECATED RDW RBC AUTO: 43.9 FL (ref 35.1–46.3)
EOSINOPHIL # BLD AUTO: 0.97 X10(3) UL (ref 0–0.7)
EOSINOPHIL NFR BLD AUTO: 11.2 %
ERYTHROCYTE [DISTWIDTH] IN BLOOD BY AUTOMATED COUNT: 12.6 % (ref 11–15)
GLOBULIN PLAS-MCNC: 3 G/DL (ref 2.8–4.4)
GLUCOSE BLD-MCNC: 89 MG/DL (ref 70–99)
HAV IGM SER QL: 2.1 MG/DL (ref 1.6–2.6)
HCT VFR BLD AUTO: 37.9 % (ref 39–53)
HGB BLD-MCNC: 12.7 G/DL (ref 13–17.5)
IMM GRANULOCYTES # BLD AUTO: 0.03 X10(3) UL (ref 0–1)
IMM GRANULOCYTES NFR BLD: 0.3 %
LYMPHOCYTES # BLD AUTO: 1.25 X10(3) UL (ref 1–4)
LYMPHOCYTES NFR BLD AUTO: 14.5 %
M PROTEIN MFR SERPL ELPH: 5.8 G/DL (ref 6.4–8.2)
MCH RBC QN AUTO: 32.4 PG (ref 26–34)
MCHC RBC AUTO-ENTMCNC: 33.5 G/DL (ref 31–37)
MCV RBC AUTO: 96.7 FL (ref 80–100)
MONOCYTES # BLD AUTO: 0.67 X10(3) UL (ref 0.1–1)
MONOCYTES NFR BLD AUTO: 7.8 %
NEUTROPHILS # BLD AUTO: 5.65 X10 (3) UL (ref 1.5–7.7)
NEUTROPHILS # BLD AUTO: 5.65 X10(3) UL (ref 1.5–7.7)
NEUTROPHILS NFR BLD AUTO: 65.5 %
OSMOLALITY SERPL CALC.SUM OF ELEC: 288 MOSM/KG (ref 275–295)
PATIENT FASTING Y/N/NP: YES
PLATELET # BLD AUTO: 149 10(3)UL (ref 150–450)
POTASSIUM SERPL-SCNC: 4.2 MMOL/L (ref 3.5–5.1)
RBC # BLD AUTO: 3.92 X10(6)UL (ref 3.8–5.8)
SODIUM SERPL-SCNC: 140 MMOL/L (ref 136–145)
VIT D+METAB SERPL-MCNC: 29.8 NG/ML (ref 30–100)
WBC # BLD AUTO: 8.6 X10(3) UL (ref 4–11)

## 2020-03-05 PROCEDURE — 85025 COMPLETE CBC W/AUTO DIFF WBC: CPT

## 2020-03-05 PROCEDURE — 82306 VITAMIN D 25 HYDROXY: CPT

## 2020-03-05 PROCEDURE — 99358 PROLONG SERVICE W/O CONTACT: CPT | Performed by: FAMILY MEDICINE

## 2020-03-05 PROCEDURE — 99310 SBSQ NF CARE HIGH MDM 45: CPT | Performed by: NURSE PRACTITIONER

## 2020-03-05 PROCEDURE — 80053 COMPREHEN METABOLIC PANEL: CPT

## 2020-03-05 PROCEDURE — 83735 ASSAY OF MAGNESIUM: CPT

## 2020-03-05 RX ORDER — CALCIUM CARBONATE 200(500)MG
1 TABLET,CHEWABLE ORAL 3 TIMES DAILY PRN
COMMUNITY
End: 2020-04-08

## 2020-03-05 RX ORDER — FLUTICASONE PROPIONATE 50 MCG
1 SPRAY, SUSPENSION (ML) NASAL DAILY
COMMUNITY

## 2020-03-05 RX ORDER — CARBOXYMETHYLCELLULOSE SODIUM 5 MG/ML
1 SOLUTION/ DROPS OPHTHALMIC EVERY 6 HOURS PRN
COMMUNITY
End: 2020-04-08

## 2020-03-05 RX ORDER — MINERAL OIL AND PETROLATUM 150; 830 MG/G; MG/G
1 OINTMENT OPHTHALMIC EVERY EVENING
COMMUNITY

## 2020-03-05 RX ORDER — TROLAMINE SALICYLATE 10 G/100G
1 CREAM TOPICAL EVERY 4 HOURS PRN
COMMUNITY
End: 2020-11-28

## 2020-03-05 RX ORDER — ACETAMINOPHEN 325 MG/1
650 TABLET ORAL EVERY 6 HOURS PRN
COMMUNITY
End: 2020-04-08

## 2020-03-05 RX ORDER — PHENOL 1.4 %
10 AEROSOL, SPRAY (ML) MUCOUS MEMBRANE EVERY EVENING
COMMUNITY
End: 2020-04-09

## 2020-03-05 RX ORDER — OXYBUTYNIN CHLORIDE 5 MG/1
5 TABLET ORAL 3 TIMES DAILY
COMMUNITY
End: 2020-09-20

## 2020-03-05 RX ORDER — ENOXAPARIN SODIUM 100 MG/ML
40 INJECTION SUBCUTANEOUS DAILY
COMMUNITY
End: 2020-04-02 | Stop reason: ALTCHOICE

## 2020-03-05 RX ORDER — BISACODYL 10 MG
10 SUPPOSITORY, RECTAL RECTAL DAILY PRN
COMMUNITY
End: 2020-04-08

## 2020-03-05 RX ORDER — BENZONATATE 100 MG/1
100 CAPSULE ORAL 3 TIMES DAILY PRN
COMMUNITY
End: 2020-04-08

## 2020-03-05 RX ORDER — HYDROCODONE BITARTRATE AND ACETAMINOPHEN 5; 325 MG/1; MG/1
1 TABLET ORAL EVERY 6 HOURS PRN
COMMUNITY
End: 2020-03-11

## 2020-03-05 RX ORDER — HYDRALAZINE HYDROCHLORIDE 10 MG/1
10 TABLET, FILM COATED ORAL EVERY 8 HOURS PRN
COMMUNITY
End: 2020-11-28

## 2020-03-05 NOTE — PROGRESS NOTES
Austin Hernandez  : 1940  Age 78year old  male patient is admitted to Facility: The [address] [place] [address] for [address] [place] [address] date:  2020-2020  [facility] acute rehab 2020-3/4/2020  Discharge date to [facility]:  3/4/20 through the night. He denies any trouble with bowels or bladder. Reviewed with him length of stay including up to 14 days of PT OT speech therapy. He and wife live at [facility] in independent living and that is her goal for discharge.   Questions a [facility]   • TRANSFORAMINAL EPIDURAL - LUMBAR Right 3/9/2016    Performed by Gamal Renner MD at [address]     Family History   Problem Relation Age of Onset   • Heart Disorder Father         MI   • Cancer Mother Chloride 5 MG Oral Tab Take 5 mg by mouth 3 (three) times daily. • dextromethorphan-guaiFENesin  MG/5ML Oral Liquid Take 10 mL by mouth 4 (four) times daily as needed.      • traZODone 25 mg Oral Tab Take 25 mg by mouth nightly as needed (insomnia 1 CAPSULE (0.4 MG) BY ORAL ROUTE ONCE DAILY 30 capsule 11   • B Complex Vitamins (VITAMIN B COMPLEX) Oral Tab Take 1 tablet by mouth daily. • Vitamin D3 (VITAMIN D3) 1000 UNITS Oral Tab Take 1,000 Units by mouth daily.          VITALS:  BP 94/57   Pulse upper or lower extremity  EXTREMITIES/VASCULAR:radial pulses 2+ and dorsalis pedal pulses 2+  NEUROLOGIC: A&OX3, left side weakness 3-4/5 LUE/LLE, very mild flattening of nasolabial fold on left, conversant, follows commands  PSYCHIATRIC: calm, cooperative evals  - Aspirin 325 mg p.o. daily  - Atorvastatin 80 mg p.o. daily  - Plavix 75 mg p.o. daily    Acute cough  - No fevers, no hypoxia, no leukocytosis  - Tessalon 100 mg capsule every 8 hours as needed for cough  - Robitussin-DM 10 mL every 4 hours as nee Vitamin D3 1000 units p.o. daily-- vitamin D level is very mildly insufficient  - Folic acid 1 mg tablet p.o. daily  - Multivitamin 1 p.o. daily  - Thiamine 100 mg p.o. daily  - Vitamin B complex 1 tablet daily    DVT prophylaxis  lovenox 40 mg daily    GI

## 2020-03-05 NOTE — PROGRESS NOTES
Cinthya Eye Author: Yolanda Corley MD     1940 MRN [id number]   [facility]  Admission 20      Last Hospital Discharge 20 PCP Abhishek Conte MD   Hospital of Discharge  [facility]        CC --admitted to [facility] at [address] Performed by Johnnie Logan MD at [address] [place] [address] 8/8/2014    Performed by Johnnie Logan MD at [address] [place] [address] N/A 10/21/2013    Performed by Johnnie Logan MD at [address] [place] [address] Patch Place 2 patches onto the skin daily. 30 patch 0   • folic acid 1 MG Oral Tab Take 1 tablet (1 mg total) by mouth daily. 30 tablet 0   • docusate sodium 100 MG Oral Cap Take 100 mg by mouth 2 (two) times daily.  30 capsule 0   • Senna 17.2 MG Oral Tab pressure 140/70, respiration 18/min, pulse 80/min temperature 97.8  PHYSICAL EXAM:  GENERAL: well developed, well nourished, in no apparent distress, sitting comfortably on the wheelchair  SKIN: no rashes, no suspicious lesions  Wound; patient has develope wife  Check his fasting labs in the morning    [address] [facility], [address] [facility]    Electronically signed

## 2020-03-05 NOTE — PROGRESS NOTES
Ramos Malik Author: Prachi Ruiz MD     1940 MRN [id number]   [facility]  Admission 20      Last Hospital Discharge 20 PCP Eddie Salmeron MD   Hospital of Discharge  [facility]       Patient admitted to [facility] at [facility]

## 2020-03-10 ENCOUNTER — SNF VISIT (OUTPATIENT)
Dept: INTERNAL MEDICINE CLINIC | Age: 80
End: 2020-03-10

## 2020-03-10 VITALS
RESPIRATION RATE: 18 BRPM | DIASTOLIC BLOOD PRESSURE: 61 MMHG | OXYGEN SATURATION: 93 % | TEMPERATURE: 97 F | HEART RATE: 71 BPM | SYSTOLIC BLOOD PRESSURE: 108 MMHG

## 2020-03-10 DIAGNOSIS — R29.898 WEAKNESS OF BOTH LEGS: ICD-10-CM

## 2020-03-10 DIAGNOSIS — I63.9 ACUTE ISCHEMIC STROKE (HCC): Primary | ICD-10-CM

## 2020-03-10 PROCEDURE — 99308 SBSQ NF CARE LOW MDM 20: CPT | Performed by: NURSE PRACTITIONER

## 2020-03-10 NOTE — PROGRESS NOTES
Nola Steinberg, 7/21/1940, 78year old, male    Chief Complaint:  Patient presents with:   Follow - Up: CVA with left hemiparesis, dysphagia, spasticity  Weakness       Subjective:   PMH significant for Depression, HTN, HL, GERD, BPH, OAB w/ urge incontine organomegaly, no masses; nontender, no guarding, no rebound tenderness. :no suprapubic distension  LYMPHATIC:no lymphedema  MUSCULOSKELETAL: no acute synovitis upper or lower extremity.   +Leaning toward his left side  EXTREMITIES/VASCULAR:radial pulses bundle branch block  - Follow-up with cardiology Dr. James Najera in 1 month     Chronic lumbar stenosis with a light right lower extremity radicular pain, and sciatica  - Tylenol 325 mg tablets p.o. every 6 as needed  - Norco 5/325 1 p.o. every 6 hours as ne

## 2020-03-11 RX ORDER — HYDROCODONE BITARTRATE AND ACETAMINOPHEN 5; 325 MG/1; MG/1
1 TABLET ORAL EVERY 6 HOURS PRN
Qty: 30 TABLET | Refills: 0 | Status: SHIPPED | OUTPATIENT
Start: 2020-03-11 | End: 2020-04-08

## 2020-03-12 ENCOUNTER — NURSE ONLY (OUTPATIENT)
Dept: LAB | Age: 80
End: 2020-03-12
Attending: FAMILY MEDICINE
Payer: MEDICARE

## 2020-03-12 ENCOUNTER — EXTERNAL FACILITY (OUTPATIENT)
Dept: FAMILY MEDICINE CLINIC | Facility: CLINIC | Age: 80
End: 2020-03-12

## 2020-03-12 DIAGNOSIS — I63.9 ACUTE ISCHEMIC STROKE (HCC): Primary | ICD-10-CM

## 2020-03-12 DIAGNOSIS — L89.629 PRESSURE INJURY OF SKIN OF LEFT HEEL, UNSPECIFIED INJURY STAGE: ICD-10-CM

## 2020-03-12 DIAGNOSIS — G81.94 LEFT HEMIPARESIS (HCC): ICD-10-CM

## 2020-03-12 DIAGNOSIS — I65.01 OCCLUSION AND STENOSIS OF RIGHT VERTEBRAL ARTERY: Primary | ICD-10-CM

## 2020-03-12 LAB
ALBUMIN SERPL-MCNC: 2.4 G/DL (ref 3.4–5)
ALBUMIN/GLOB SERPL: 0.7 {RATIO} (ref 1–2)
ALP LIVER SERPL-CCNC: 185 U/L (ref 45–117)
ALT SERPL-CCNC: 41 U/L (ref 16–61)
ANION GAP SERPL CALC-SCNC: 3 MMOL/L (ref 0–18)
AST SERPL-CCNC: 23 U/L (ref 15–37)
BASOPHILS # BLD AUTO: 0.06 X10(3) UL (ref 0–0.2)
BASOPHILS NFR BLD AUTO: 0.8 %
BILIRUB SERPL-MCNC: 0.5 MG/DL (ref 0.1–2)
BUN BLD-MCNC: 15 MG/DL (ref 7–18)
BUN/CREAT SERPL: 20.3 (ref 10–20)
CALCIUM BLD-MCNC: 8.9 MG/DL (ref 8.5–10.1)
CHLORIDE SERPL-SCNC: 104 MMOL/L (ref 98–112)
CO2 SERPL-SCNC: 29 MMOL/L (ref 21–32)
CREAT BLD-MCNC: 0.74 MG/DL (ref 0.7–1.3)
DEPRECATED RDW RBC AUTO: 44.6 FL (ref 35.1–46.3)
EOSINOPHIL # BLD AUTO: 0.68 X10(3) UL (ref 0–0.7)
EOSINOPHIL NFR BLD AUTO: 8.7 %
ERYTHROCYTE [DISTWIDTH] IN BLOOD BY AUTOMATED COUNT: 12.3 % (ref 11–15)
GLOBULIN PLAS-MCNC: 3.3 G/DL (ref 2.8–4.4)
GLUCOSE BLD-MCNC: 86 MG/DL (ref 70–99)
HCT VFR BLD AUTO: 34.8 % (ref 39–53)
HGB BLD-MCNC: 11.6 G/DL (ref 13–17.5)
IMM GRANULOCYTES # BLD AUTO: 0.02 X10(3) UL (ref 0–1)
IMM GRANULOCYTES NFR BLD: 0.3 %
LYMPHOCYTES # BLD AUTO: 1.3 X10(3) UL (ref 1–4)
LYMPHOCYTES NFR BLD AUTO: 16.5 %
M PROTEIN MFR SERPL ELPH: 5.7 G/DL (ref 6.4–8.2)
MCH RBC QN AUTO: 33.1 PG (ref 26–34)
MCHC RBC AUTO-ENTMCNC: 33.3 G/DL (ref 31–37)
MCV RBC AUTO: 99.4 FL (ref 80–100)
MONOCYTES # BLD AUTO: 1.02 X10(3) UL (ref 0.1–1)
MONOCYTES NFR BLD AUTO: 13 %
NEUTROPHILS # BLD AUTO: 4.78 X10 (3) UL (ref 1.5–7.7)
NEUTROPHILS # BLD AUTO: 4.78 X10(3) UL (ref 1.5–7.7)
NEUTROPHILS NFR BLD AUTO: 60.7 %
OSMOLALITY SERPL CALC.SUM OF ELEC: 282 MOSM/KG (ref 275–295)
PATIENT FASTING Y/N/NP: YES
PLATELET # BLD AUTO: 158 10(3)UL (ref 150–450)
POTASSIUM SERPL-SCNC: 4.1 MMOL/L (ref 3.5–5.1)
RBC # BLD AUTO: 3.5 X10(6)UL (ref 3.8–5.8)
SODIUM SERPL-SCNC: 136 MMOL/L (ref 136–145)
WBC # BLD AUTO: 7.9 X10(3) UL (ref 4–11)

## 2020-03-12 PROCEDURE — 80053 COMPREHEN METABOLIC PANEL: CPT

## 2020-03-12 PROCEDURE — 99309 SBSQ NF CARE MODERATE MDM 30: CPT | Performed by: FAMILY MEDICINE

## 2020-03-12 PROCEDURE — 85025 COMPLETE CBC W/AUTO DIFF WBC: CPT

## 2020-03-15 NOTE — PROGRESS NOTES
Desi Peña Author: Joy Blackwell MD     1940 MRN [id number]   [facility]  Admission 20      Last Hospital Discharge 20 PCP Leela Roberto MD   Hospital of Discharge  [address] [place] [address]--follow-up on the tulio Performed by Michelle Lynch MD at [address] [place] [address] N/A 9/15/2014    Performed by Michelle Lynch MD at [address] [place] [address] N/A 8/8/2014    Performed by Michelle Lynch MD at [facility] bisacodyl (DULCOLAX) 10 MG Rectal Suppos Place 10 mg rectally daily as needed. • Calcium Carbonate Antacid 500 MG Oral Chew Tab Chew 1 tablet by mouth 3 (three) times daily as needed for Heartburn.      • Carboxymethylcellulose Sodium (LUBRICANT EYE ISABELLA (17.2 mg total) by mouth nightly. 30 tablet 0   • PEG 3350 Oral Powd Pack Take 17 g by mouth daily as needed (constipation). 30 each 0   • multivitamin Oral Tab Take 1 tablet by mouth daily.  30 tablet 0   • glycerin-Hypromellose- 0.2-0.2-1 % Ophthal tenderness  LUNGS: clear to auscultation  CARDIO: RRR without murmur  GI: good BS's, no masses, HSM or tenderness  MUSCULOSKELETAL: back is not tender, FROM of the back  EXTREMITIES: no cyanosis, clubbing or edema  NEURO: Oriented times three,   Left-sided

## 2020-03-17 ENCOUNTER — SNF VISIT (OUTPATIENT)
Dept: INTERNAL MEDICINE CLINIC | Age: 80
End: 2020-03-17

## 2020-03-17 VITALS
DIASTOLIC BLOOD PRESSURE: 55 MMHG | TEMPERATURE: 98 F | RESPIRATION RATE: 19 BRPM | OXYGEN SATURATION: 94 % | HEART RATE: 63 BPM | SYSTOLIC BLOOD PRESSURE: 96 MMHG

## 2020-03-17 DIAGNOSIS — M79.671 HEEL PAIN, BILATERAL: ICD-10-CM

## 2020-03-17 DIAGNOSIS — R29.898 WEAKNESS OF BOTH LEGS: ICD-10-CM

## 2020-03-17 DIAGNOSIS — I63.9 ACUTE ISCHEMIC STROKE (HCC): Primary | ICD-10-CM

## 2020-03-17 DIAGNOSIS — M79.672 HEEL PAIN, BILATERAL: ICD-10-CM

## 2020-03-17 PROCEDURE — 99308 SBSQ NF CARE LOW MDM 20: CPT | Performed by: NURSE PRACTITIONER

## 2020-03-17 RX ORDER — TRAMADOL HYDROCHLORIDE 50 MG/1
TABLET ORAL EVERY 6 HOURS PRN
COMMUNITY
End: 2020-04-09

## 2020-03-17 NOTE — PROGRESS NOTES
Lakia Aaronberenice, 7/21/1940, 78year old, male    Chief Complaint:  Patient presents with:   Follow - Up: CVA with left hemiparesis, dysphagia, spasticity  Heel Pain: bilateral       Subjective:   PMH significant for Depression, HTN, HL, GERD, BPH, OAB w/ ur EXTREMITIES/VASCULAR:radial pulses 2+ and dorsalis pedal pulses 2+  NEUROLOGIC: A&OX3, left side weakness 3-4/5 LUE/LLE, very mild flattening of nasolabial fold on left, conversant, follows commands  PSYCHIATRIC: calm, cooperative, mood and affect approp lumbar stenosis with a light right lower extremity radicular pain, and sciatica  - Tylenol 325 mg tablets p.o. every 6 as needed  - Norco 5/325 1 p.o. every 6 hours as needed pain  - Lidocaine topical patch on 12 hours off 12 hours  - Gabapentin 600 mg p. o

## 2020-03-19 ENCOUNTER — SNF VISIT (OUTPATIENT)
Dept: INTERNAL MEDICINE CLINIC | Age: 80
End: 2020-03-19

## 2020-03-19 ENCOUNTER — NURSE ONLY (OUTPATIENT)
Dept: LAB | Age: 80
End: 2020-03-19
Attending: FAMILY MEDICINE
Payer: MEDICARE

## 2020-03-19 VITALS
WEIGHT: 188 LBS | TEMPERATURE: 97 F | SYSTOLIC BLOOD PRESSURE: 89 MMHG | RESPIRATION RATE: 18 BRPM | BODY MASS INDEX: 29 KG/M2 | OXYGEN SATURATION: 92 % | HEART RATE: 64 BPM | DIASTOLIC BLOOD PRESSURE: 52 MMHG

## 2020-03-19 DIAGNOSIS — S90.829D: ICD-10-CM

## 2020-03-19 DIAGNOSIS — R26.9 GAIT ABNORMALITY: ICD-10-CM

## 2020-03-19 DIAGNOSIS — I63.9 ACUTE ISCHEMIC STROKE (HCC): Primary | ICD-10-CM

## 2020-03-19 DIAGNOSIS — I63.511 ARTERIAL ISCHEMIC STROKE, MCA (MIDDLE CEREBRAL ARTERY), RIGHT, ACUTE (HCC): ICD-10-CM

## 2020-03-19 DIAGNOSIS — M79.672 HEEL PAIN, BILATERAL: ICD-10-CM

## 2020-03-19 DIAGNOSIS — M79.671 HEEL PAIN, BILATERAL: ICD-10-CM

## 2020-03-19 DIAGNOSIS — M62.81 MUSCLE WEAKNESS (GENERALIZED): Primary | ICD-10-CM

## 2020-03-19 LAB
ALBUMIN SERPL-MCNC: 2.3 G/DL (ref 3.4–5)
ALBUMIN/GLOB SERPL: 0.7 {RATIO} (ref 1–2)
ALP LIVER SERPL-CCNC: 190 U/L (ref 45–117)
ALT SERPL-CCNC: 31 U/L (ref 16–61)
ANION GAP SERPL CALC-SCNC: 3 MMOL/L (ref 0–18)
AST SERPL-CCNC: 25 U/L (ref 15–37)
BASOPHILS # BLD AUTO: 0.06 X10(3) UL (ref 0–0.2)
BASOPHILS NFR BLD AUTO: 0.6 %
BILIRUB SERPL-MCNC: 0.4 MG/DL (ref 0.1–2)
BUN BLD-MCNC: 17 MG/DL (ref 7–18)
BUN/CREAT SERPL: 25.4 (ref 10–20)
CALCIUM BLD-MCNC: 8.4 MG/DL (ref 8.5–10.1)
CHLORIDE SERPL-SCNC: 107 MMOL/L (ref 98–112)
CO2 SERPL-SCNC: 29 MMOL/L (ref 21–32)
CREAT BLD-MCNC: 0.67 MG/DL (ref 0.7–1.3)
DEPRECATED RDW RBC AUTO: 45.1 FL (ref 35.1–46.3)
EOSINOPHIL # BLD AUTO: 0.78 X10(3) UL (ref 0–0.7)
EOSINOPHIL NFR BLD AUTO: 8 %
ERYTHROCYTE [DISTWIDTH] IN BLOOD BY AUTOMATED COUNT: 12.5 % (ref 11–15)
GLOBULIN PLAS-MCNC: 3.4 G/DL (ref 2.8–4.4)
GLUCOSE BLD-MCNC: 89 MG/DL (ref 70–99)
HCT VFR BLD AUTO: 33.3 % (ref 39–53)
HGB BLD-MCNC: 10.9 G/DL (ref 13–17.5)
IMM GRANULOCYTES # BLD AUTO: 0.09 X10(3) UL (ref 0–1)
IMM GRANULOCYTES NFR BLD: 0.9 %
LYMPHOCYTES # BLD AUTO: 1.68 X10(3) UL (ref 1–4)
LYMPHOCYTES NFR BLD AUTO: 17.2 %
M PROTEIN MFR SERPL ELPH: 5.7 G/DL (ref 6.4–8.2)
MCH RBC QN AUTO: 32.2 PG (ref 26–34)
MCHC RBC AUTO-ENTMCNC: 32.7 G/DL (ref 31–37)
MCV RBC AUTO: 98.5 FL (ref 80–100)
MONOCYTES # BLD AUTO: 0.74 X10(3) UL (ref 0.1–1)
MONOCYTES NFR BLD AUTO: 7.6 %
NEUTROPHILS # BLD AUTO: 6.43 X10 (3) UL (ref 1.5–7.7)
NEUTROPHILS # BLD AUTO: 6.43 X10(3) UL (ref 1.5–7.7)
NEUTROPHILS NFR BLD AUTO: 65.7 %
OSMOLALITY SERPL CALC.SUM OF ELEC: 289 MOSM/KG (ref 275–295)
PATIENT FASTING Y/N/NP: YES
PLATELET # BLD AUTO: 237 10(3)UL (ref 150–450)
POTASSIUM SERPL-SCNC: 3.9 MMOL/L (ref 3.5–5.1)
RBC # BLD AUTO: 3.38 X10(6)UL (ref 3.8–5.8)
SODIUM SERPL-SCNC: 139 MMOL/L (ref 136–145)
WBC # BLD AUTO: 9.8 X10(3) UL (ref 4–11)

## 2020-03-19 PROCEDURE — 99308 SBSQ NF CARE LOW MDM 20: CPT | Performed by: NURSE PRACTITIONER

## 2020-03-19 PROCEDURE — 85025 COMPLETE CBC W/AUTO DIFF WBC: CPT

## 2020-03-19 PROCEDURE — 80053 COMPREHEN METABOLIC PANEL: CPT

## 2020-03-19 NOTE — PROGRESS NOTES
Desi Peña, 7/21/1940, 78year old, male    Chief Complaint:  Patient presents with:   Follow - Up: CVA with left hemiparesis, dysphagia, spasticity  Pain: bilateral heels       Subjective:   PMH significant for Depression, HTN, HL, GERD, BPH, OAB w/ u LUE/LLE, very mild flattening of nasolabial fold on left, conversant, follows commands  PSYCHIATRIC: calm, cooperative, mood and affect appropriate to situation    Medications reviewed: Yes    Diagnostics reviewed:       Lab Results   Component Value Date Robitussin-DM 10 mL every 4 hours as needed cough  - Fluticasone nasal spray 1 spray intranasal daily each nare  - Monitoring for further symptoms     Hypertension, hyperlipidemia   - aspirin 325 mg p.o. daily  - Atorvastatin 80 mg p.o. daily  - Hydralazin prophylaxis  lovenox 40 mg daily     GI prophylaxis  Famotidine     Labs  CBC CMP weekly on Thursdays     Follow Ups:  PCP within 7 days of DC Dr. Eli Barcenas [phone]  30-day event monitor completed 3/12  Follow-up with Dr. Rad Hunter from neurology 292/6

## 2020-03-26 ENCOUNTER — EXTERNAL FACILITY (OUTPATIENT)
Dept: FAMILY MEDICINE CLINIC | Facility: CLINIC | Age: 80
End: 2020-03-26

## 2020-03-26 ENCOUNTER — NURSE ONLY (OUTPATIENT)
Dept: LAB | Age: 80
End: 2020-03-26
Attending: FAMILY MEDICINE
Payer: MEDICARE

## 2020-03-26 DIAGNOSIS — N40.1 BENIGN NON-NODULAR PROSTATIC HYPERPLASIA WITH LOWER URINARY TRACT SYMPTOMS: ICD-10-CM

## 2020-03-26 DIAGNOSIS — L89.629 PRESSURE INJURY OF SKIN OF LEFT HEEL, UNSPECIFIED INJURY STAGE: ICD-10-CM

## 2020-03-26 DIAGNOSIS — R19.7 DIARRHEA, UNSPECIFIED TYPE: Primary | ICD-10-CM

## 2020-03-26 DIAGNOSIS — G81.94 LEFT HEMIPARESIS (HCC): ICD-10-CM

## 2020-03-26 DIAGNOSIS — I63.511 ARTERIAL ISCHEMIC STROKE, MCA (MIDDLE CEREBRAL ARTERY), RIGHT, ACUTE (HCC): Primary | ICD-10-CM

## 2020-03-26 LAB
ALBUMIN SERPL-MCNC: 2.4 G/DL (ref 3.4–5)
ALBUMIN/GLOB SERPL: 0.8 {RATIO} (ref 1–2)
ALP LIVER SERPL-CCNC: 295 U/L (ref 45–117)
ALT SERPL-CCNC: 41 U/L (ref 16–61)
ANION GAP SERPL CALC-SCNC: 3 MMOL/L (ref 0–18)
AST SERPL-CCNC: 32 U/L (ref 15–37)
BASOPHILS # BLD AUTO: 0.06 X10(3) UL (ref 0–0.2)
BASOPHILS NFR BLD AUTO: 0.6 %
BILIRUB SERPL-MCNC: 0.6 MG/DL (ref 0.1–2)
BUN BLD-MCNC: 15 MG/DL (ref 7–18)
BUN/CREAT SERPL: 20.3 (ref 10–20)
CALCIUM BLD-MCNC: 8.3 MG/DL (ref 8.5–10.1)
CHLORIDE SERPL-SCNC: 109 MMOL/L (ref 98–112)
CO2 SERPL-SCNC: 28 MMOL/L (ref 21–32)
CREAT BLD-MCNC: 0.74 MG/DL (ref 0.7–1.3)
DEPRECATED RDW RBC AUTO: 45.7 FL (ref 35.1–46.3)
EOSINOPHIL # BLD AUTO: 1.01 X10(3) UL (ref 0–0.7)
EOSINOPHIL NFR BLD AUTO: 10.1 %
ERYTHROCYTE [DISTWIDTH] IN BLOOD BY AUTOMATED COUNT: 12.8 % (ref 11–15)
GLOBULIN PLAS-MCNC: 3.2 G/DL (ref 2.8–4.4)
GLUCOSE BLD-MCNC: 90 MG/DL (ref 70–99)
HCT VFR BLD AUTO: 34.3 % (ref 39–53)
HGB BLD-MCNC: 11.3 G/DL (ref 13–17.5)
IMM GRANULOCYTES # BLD AUTO: 0.03 X10(3) UL (ref 0–1)
IMM GRANULOCYTES NFR BLD: 0.3 %
LYMPHOCYTES # BLD AUTO: 1.12 X10(3) UL (ref 1–4)
LYMPHOCYTES NFR BLD AUTO: 11.2 %
M PROTEIN MFR SERPL ELPH: 5.6 G/DL (ref 6.4–8.2)
MCH RBC QN AUTO: 32.4 PG (ref 26–34)
MCHC RBC AUTO-ENTMCNC: 32.9 G/DL (ref 31–37)
MCV RBC AUTO: 98.3 FL (ref 80–100)
MONOCYTES # BLD AUTO: 0.93 X10(3) UL (ref 0.1–1)
MONOCYTES NFR BLD AUTO: 9.3 %
NEUTROPHILS # BLD AUTO: 6.83 X10 (3) UL (ref 1.5–7.7)
NEUTROPHILS # BLD AUTO: 6.83 X10(3) UL (ref 1.5–7.7)
NEUTROPHILS NFR BLD AUTO: 68.5 %
OSMOLALITY SERPL CALC.SUM OF ELEC: 290 MOSM/KG (ref 275–295)
PATIENT FASTING Y/N/NP: YES
PLATELET # BLD AUTO: 229 10(3)UL (ref 150–450)
POTASSIUM SERPL-SCNC: 4 MMOL/L (ref 3.5–5.1)
RBC # BLD AUTO: 3.49 X10(6)UL (ref 3.8–5.8)
SODIUM SERPL-SCNC: 140 MMOL/L (ref 136–145)
WBC # BLD AUTO: 10 X10(3) UL (ref 4–11)

## 2020-03-26 PROCEDURE — 85025 COMPLETE CBC W/AUTO DIFF WBC: CPT

## 2020-03-26 PROCEDURE — 99309 SBSQ NF CARE MODERATE MDM 30: CPT | Performed by: FAMILY MEDICINE

## 2020-03-26 PROCEDURE — 36415 COLL VENOUS BLD VENIPUNCTURE: CPT

## 2020-03-26 PROCEDURE — 80053 COMPREHEN METABOLIC PANEL: CPT

## 2020-03-27 NOTE — PROGRESS NOTES
Rad Orellana Author: Trini Ballard MD     1940 MRN [id number]   [facility]  Admission 20      Last Hospital Discharge 20 PCP Marina Crain MD   Hospital of Discharge  [address] [facility]--follow-up on the tulio UNILATERAL  2/7/2020        • IR INTRA ARTERIAL STROKE INTERVENTION  2/7/2020        • LUMBAR EPIDURAL N/A 11/10/2014    Performed by Sudhir Moreira MD at [id number] [place] [address] N/A 9/15/2014    Performed by Sudhir Moreira MD 30 tablet 0   • acetaminophen 325 MG Oral Tab Take 650 mg by mouth every 6 (six) hours as needed for Pain. • benzonatate 100 MG Oral Cap Take 100 mg by mouth 3 (three) times daily as needed for cough.      • bisacodyl (DULCOLAX) 10 MG Rectal Suppos Plac folic acid 1 MG Oral Tab Take 1 tablet (1 mg total) by mouth daily. 30 tablet 0   • docusate sodium 100 MG Oral Cap Take 100 mg by mouth 2 (two) times daily. 30 capsule 0   • Senna 17.2 MG Oral Tab Take 1 tablet (17.2 mg total) by mouth nightly.  30 tablet is not tender, FROM of the back  EXTREMITIES: no cyanosis, clubbing or edema  NEURO: Oriented times three,   Left-sided weakness, muscle strength 3 out of 5  Conversant and follows commands    ASSESSMENT AND PLAN:  Diagnoses and all orders for this visit:

## 2020-04-02 ENCOUNTER — SNF DISCHARGE (OUTPATIENT)
Dept: INTERNAL MEDICINE CLINIC | Age: 80
End: 2020-04-02

## 2020-04-02 ENCOUNTER — NURSE ONLY (OUTPATIENT)
Dept: LAB | Age: 80
End: 2020-04-02
Attending: FAMILY MEDICINE
Payer: MEDICARE

## 2020-04-02 VITALS
BODY MASS INDEX: 29 KG/M2 | SYSTOLIC BLOOD PRESSURE: 121 MMHG | HEART RATE: 85 BPM | RESPIRATION RATE: 18 BRPM | OXYGEN SATURATION: 95 % | TEMPERATURE: 97 F | WEIGHT: 186.69 LBS | DIASTOLIC BLOOD PRESSURE: 77 MMHG

## 2020-04-02 DIAGNOSIS — I10 ESSENTIAL HYPERTENSION, BENIGN: ICD-10-CM

## 2020-04-02 DIAGNOSIS — M48.062 SPINAL STENOSIS OF LUMBAR REGION WITH NEUROGENIC CLAUDICATION: ICD-10-CM

## 2020-04-02 DIAGNOSIS — R53.1 WEAKNESS FOLLOWING CEREBROVASCULAR ACCIDENT (CVA): ICD-10-CM

## 2020-04-02 DIAGNOSIS — I63.9 CEREBROVASCULAR ACCIDENT (CVA), UNSPECIFIED MECHANISM (HCC): ICD-10-CM

## 2020-04-02 DIAGNOSIS — N40.1 BENIGN NON-NODULAR PROSTATIC HYPERPLASIA WITH LOWER URINARY TRACT SYMPTOMS: ICD-10-CM

## 2020-04-02 DIAGNOSIS — R29.898 WEAKNESS OF BOTH LEGS: ICD-10-CM

## 2020-04-02 DIAGNOSIS — I69.398 WEAKNESS FOLLOWING CEREBROVASCULAR ACCIDENT (CVA): ICD-10-CM

## 2020-04-02 DIAGNOSIS — R26.9 GAIT ABNORMALITY: ICD-10-CM

## 2020-04-02 DIAGNOSIS — E56.9 VITAMIN DISEASE: Primary | ICD-10-CM

## 2020-04-02 DIAGNOSIS — S90.829D: Primary | ICD-10-CM

## 2020-04-02 PROCEDURE — 99316 NF DSCHRG MGMT 30 MIN+: CPT | Performed by: NURSE PRACTITIONER

## 2020-04-02 PROCEDURE — 80053 COMPREHEN METABOLIC PANEL: CPT

## 2020-04-02 PROCEDURE — 85025 COMPLETE CBC W/AUTO DIFF WBC: CPT

## 2020-04-02 NOTE — PROGRESS NOTES
Floridalma Orosco, 7/21/1940, 78year old, male is being discharged from [address] [place] [address] at [address] [facility]  Date of Admission: March 4, 2020    Date of Discharge: April 4, 2020                                 Admitting Diagnoses CDI drsg  EYES: sclera anicteric, conjunctiva normal; there is no nystagmus, no drainage from eyes  HENT: normocephalic; normal nose, no nasal drainage, mucous membranes pink, moist.  NECK: supple, non tender, FROM  BREAST: deferred  RESPIRATORY:clear, no 289 04/02/2020    ALKPHO 405 (H) 04/02/2020    AST 55 (H) 04/02/2020    ALT 55 04/02/2020    BILT 0.7 04/02/2020    TP 5.8 (L) 04/02/2020    ALB 2.4 (L) 04/02/2020    GLOBULIN 3.4 04/02/2020    AGRATIO 2.1 02/03/2016     04/02/2020    K 3.8 04/02/202 and sciatica  - Tylenol 325 mg tablets p.o. every 6 as needed  - Norco 5/325 1 p.o. every 6 hours as needed pain  - Lidocaine topical patch on 12 hours off 12 hours  - Gabapentin 600 mg p.o. 3 times daily     Bowel management  - Sennosides 8.6 mg tablet ta

## 2020-04-22 PROBLEM — N39.41 URGE INCONTINENCE OF URINE: Status: ACTIVE | Noted: 2017-12-08

## 2020-05-07 PROBLEM — Z86.73 HISTORY OF CVA (CEREBROVASCULAR ACCIDENT) WITHOUT RESIDUAL DEFICITS: Status: RESOLVED | Noted: 2018-05-07 | Resolved: 2020-05-07

## 2020-05-07 PROBLEM — I69.354 HEMIPARESIS AFFECTING LEFT SIDE AS LATE EFFECT OF CEREBROVASCULAR ACCIDENT (CVA) (HCC): Status: ACTIVE | Noted: 2020-05-07

## 2020-05-21 ENCOUNTER — TELEPHONE (OUTPATIENT)
Dept: NEUROLOGY | Facility: CLINIC | Age: 80
End: 2020-05-21

## 2020-05-21 NOTE — TELEPHONE ENCOUNTER
Placed call to obtain 90-day mRS for pt who received IV alteplase on 2/7/20 with L hemiparesis and facial droop, found to have R MCA territory infarct. LKN 3 hrs prior to arrival, initial NIH 20.         mRS 4:  Requires assistance and walker to ambulate

## 2020-10-06 ENCOUNTER — IMMUNIZATION (OUTPATIENT)
Dept: INTERNAL MEDICINE CLINIC | Facility: CLINIC | Age: 80
End: 2020-10-06
Payer: MEDICARE

## 2020-10-06 DIAGNOSIS — Z23 NEED FOR VACCINATION: ICD-10-CM

## 2020-10-06 PROCEDURE — G0008 ADMIN INFLUENZA VIRUS VAC: HCPCS | Performed by: INTERNAL MEDICINE

## 2020-10-06 PROCEDURE — 90662 IIV NO PRSV INCREASED AG IM: CPT | Performed by: INTERNAL MEDICINE

## 2020-11-25 PROBLEM — D69.6 THROMBOCYTOPENIA (HCC): Status: ACTIVE | Noted: 2020-11-25

## 2021-01-18 PROBLEM — F33.0 MILD RECURRENT MAJOR DEPRESSION (HCC): Status: ACTIVE | Noted: 2021-01-18

## 2021-01-18 PROBLEM — L89.620 PRESSURE INJURY OF BOTH HEELS, UNSTAGEABLE (HCC): Status: ACTIVE | Noted: 2021-01-18

## 2021-01-18 PROBLEM — L89.610 PRESSURE INJURY OF BOTH HEELS, UNSTAGEABLE (HCC): Status: ACTIVE | Noted: 2021-01-18

## 2021-02-11 DIAGNOSIS — Z23 NEED FOR VACCINATION: ICD-10-CM

## 2021-09-23 ENCOUNTER — IMMUNIZATION (OUTPATIENT)
Dept: INTERNAL MEDICINE CLINIC | Facility: CLINIC | Age: 81
End: 2021-09-23
Payer: MEDICARE

## 2021-09-23 ENCOUNTER — MED REC SCAN ONLY (OUTPATIENT)
Dept: INTERNAL MEDICINE CLINIC | Facility: CLINIC | Age: 81
End: 2021-09-23

## 2021-09-23 DIAGNOSIS — Z23 NEED FOR VACCINATION: Primary | ICD-10-CM

## 2021-09-23 PROCEDURE — G0008 ADMIN INFLUENZA VIRUS VAC: HCPCS | Performed by: INTERNAL MEDICINE

## 2021-09-23 PROCEDURE — 90662 IIV NO PRSV INCREASED AG IM: CPT | Performed by: INTERNAL MEDICINE

## 2021-12-25 ENCOUNTER — HOSPITAL ENCOUNTER (EMERGENCY)
Facility: HOSPITAL | Age: 81
Discharge: HOME | End: 2021-12-26
Attending: EMERGENCY MEDICINE
Payer: MEDICARE

## 2021-12-25 DIAGNOSIS — E83.51 HYPOCALCEMIA: ICD-10-CM

## 2021-12-25 DIAGNOSIS — R55 SYNCOPE, NEAR: Primary | ICD-10-CM

## 2021-12-25 LAB
ALBUMIN SERPL-MCNC: 2.7 G/DL (ref 3.4–5)
ALBUMIN/GLOB SERPL: 0.9 {RATIO} (ref 1–2)
ALP LIVER SERPL-CCNC: 55 U/L
ALT SERPL-CCNC: 18 U/L
ANION GAP SERPL CALC-SCNC: 4 MMOL/L (ref 0–18)
AST SERPL-CCNC: 11 U/L (ref 15–37)
BASOPHILS # BLD AUTO: 0.05 X10(3) UL (ref 0–0.2)
BASOPHILS NFR BLD AUTO: 0.8 %
BILIRUB SERPL-MCNC: 0.2 MG/DL (ref 0.1–2)
BUN BLD-MCNC: 20 MG/DL (ref 7–18)
CALCIUM BLD-MCNC: 7.5 MG/DL (ref 8.5–10.1)
CHLORIDE SERPL-SCNC: 115 MMOL/L (ref 98–112)
CO2 SERPL-SCNC: 24 MMOL/L (ref 21–32)
CREAT BLD-MCNC: 0.89 MG/DL
EOSINOPHIL # BLD AUTO: 0.42 X10(3) UL (ref 0–0.7)
EOSINOPHIL NFR BLD AUTO: 6.9 %
ERYTHROCYTE [DISTWIDTH] IN BLOOD BY AUTOMATED COUNT: 12.4 %
GLOBULIN PLAS-MCNC: 2.9 G/DL (ref 2.8–4.4)
GLUCOSE BLD-MCNC: 108 MG/DL (ref 70–99)
GLUCOSE BLD-MCNC: 90 MG/DL (ref 70–99)
HCT VFR BLD AUTO: 38 %
HGB BLD-MCNC: 12.8 G/DL
IMM GRANULOCYTES # BLD AUTO: 0.03 X10(3) UL (ref 0–1)
IMM GRANULOCYTES NFR BLD: 0.5 %
LYMPHOCYTES # BLD AUTO: 1.42 X10(3) UL (ref 1–4)
LYMPHOCYTES NFR BLD AUTO: 23.4 %
MCH RBC QN AUTO: 33 PG (ref 26–34)
MCHC RBC AUTO-ENTMCNC: 33.7 G/DL (ref 31–37)
MCV RBC AUTO: 97.9 FL
MONOCYTES # BLD AUTO: 0.62 X10(3) UL (ref 0.1–1)
MONOCYTES NFR BLD AUTO: 10.2 %
NEUTROPHILS # BLD AUTO: 3.54 X10 (3) UL (ref 1.5–7.7)
NEUTROPHILS # BLD AUTO: 3.54 X10(3) UL (ref 1.5–7.7)
NEUTROPHILS NFR BLD AUTO: 58.2 %
OSMOLALITY SERPL CALC.SUM OF ELEC: 298 MOSM/KG (ref 275–295)
PLATELET # BLD AUTO: 147 10(3)UL (ref 150–450)
POTASSIUM SERPL-SCNC: 3.7 MMOL/L (ref 3.5–5.1)
PROT SERPL-MCNC: 5.6 G/DL (ref 6.4–8.2)
RBC # BLD AUTO: 3.88 X10(6)UL
SARS-COV-2 RNA RESP QL NAA+PROBE: NOT DETECTED
SODIUM SERPL-SCNC: 143 MMOL/L (ref 136–145)
TROPONIN I HIGH SENSITIVITY: 8 NG/L
WBC # BLD AUTO: 6.1 X10(3) UL (ref 4–11)

## 2021-12-25 PROCEDURE — 93005 ELECTROCARDIOGRAM TRACING: CPT

## 2021-12-25 PROCEDURE — 84484 ASSAY OF TROPONIN QUANT: CPT | Performed by: EMERGENCY MEDICINE

## 2021-12-25 PROCEDURE — 82962 GLUCOSE BLOOD TEST: CPT

## 2021-12-25 PROCEDURE — 99284 EMERGENCY DEPT VISIT MOD MDM: CPT

## 2021-12-25 PROCEDURE — 80053 COMPREHEN METABOLIC PANEL: CPT

## 2021-12-25 PROCEDURE — 80053 COMPREHEN METABOLIC PANEL: CPT | Performed by: EMERGENCY MEDICINE

## 2021-12-25 PROCEDURE — 36415 COLL VENOUS BLD VENIPUNCTURE: CPT

## 2021-12-25 PROCEDURE — 85025 COMPLETE CBC W/AUTO DIFF WBC: CPT

## 2021-12-25 PROCEDURE — 93010 ELECTROCARDIOGRAM REPORT: CPT

## 2021-12-25 PROCEDURE — 85025 COMPLETE CBC W/AUTO DIFF WBC: CPT | Performed by: EMERGENCY MEDICINE

## 2021-12-26 VITALS
HEART RATE: 81 BPM | WEIGHT: 188 LBS | BODY MASS INDEX: 29 KG/M2 | RESPIRATION RATE: 21 BRPM | OXYGEN SATURATION: 98 % | TEMPERATURE: 98 F | SYSTOLIC BLOOD PRESSURE: 107 MMHG | DIASTOLIC BLOOD PRESSURE: 53 MMHG

## 2021-12-26 LAB
ATRIAL RATE: 60 BPM
P AXIS: 6 DEGREES
P-R INTERVAL: 196 MS
Q-T INTERVAL: 474 MS
QRS DURATION: 168 MS
QTC CALCULATION (BEZET): 474 MS
R AXIS: -27 DEGREES
T AXIS: 83 DEGREES
VENTRICULAR RATE: 60 BPM

## 2021-12-26 NOTE — ED PROVIDER NOTES
Patient Seen in: [facility] Emergency Department      History   Patient presents with:  Syncope    Stated Complaint:     Subjective:   HPI    Patient standing in bathroom brushing teeth getting ready to bed with caregiver.   He states that he suddenl [facility] N/A 8/8/2014    Procedure: LUMBAR EPIDURAL;  Surgeon: Jaren Jackson MD;  Location: [facility] GI & Jono Us NDL/CATH SPI [facility]  9/15/2014    Procedure: ;  Surgeon: Jaren Jackson MD;  Location: [facility] MANAGEMENT   • IR ANGIOGRAM CEREBRAL CAROTID UNILATERAL  2/7/2020        • IR INTRA ARTERIAL STROKE INTERVENTION  2/7/2020        • OTHER SURGICAL HISTORY      hand surgery right tendon   • OTHER SURGICAL HISTORY  1/16/2013    right hip ORIF of intertrocha LUMBAR EPIDURAL;  Surgeon: Chelsie Rincon MD;  Location: [facility] FOR PAIN MANAGEMENT   • PATIENT [address] FOR IV ANTIBIOTIC SURGICAL SITE INFECTION PROPHYLAXIS.  N/A 8/8/2014    Procedure: LUMBAR EPIDURAL;  Surgeon: Chelsie Rincon MD well-developed. He is not diaphoretic. Comments: He is awake alert and oriented he speaking in full sentences he is in no acute distress his wife is at bedside.   He seems to be somewhat anxious to get out of here and a little bit agitated that he is h Abnormal; Notable for the following components:       Result Value    Chloride 115 (*)     BUN 20 (*)     Calcium, Total 7.5 (*)     Calculated Osmolality 298 (*)     AST 11 (*)     Total Protein 5.6 (*)     Albumin 2.7 (*)     A/G Ratio 0.9 (*)     All ot go home. Troponin is negative. Blood sugar is benign.                              Disposition and Plan     Clinical Impression:  Syncope, near  (primary encounter diagnosis)  Hypocalcemia     Disposition:  Discharge  12/26/2021 12:41 am    Follow-up:  Ed

## 2021-12-26 NOTE — ED QUICK NOTES
Repositioned pt on side of cart to use urinal. Unable to urinate at this time. Pt boosted upright for comfort. Pt and wife updated with POC.

## 2021-12-26 NOTE — ED QUICK NOTES
Reports sensation of dizziness earlier this evening while standing to brush teeth. Felt light headed and weak. Upon arrival from EMS pt was orthostatic positive and ECG shown new BBB rhythm.  At this time pt has no complaints and is resting comfortble in be

## 2021-12-26 NOTE — ED QUICK NOTES
Pt and family updated with POC. Aware of plan to go to [facility] landing per [facility].

## 2022-01-19 ENCOUNTER — HOSPITAL ENCOUNTER (OUTPATIENT)
Facility: HOSPITAL | Age: 82
Setting detail: OBSERVATION
Discharge: NURSING, REHAB OR LONG-TERM CARE | End: 2022-01-21
Attending: EMERGENCY MEDICINE | Admitting: INTERNAL MEDICINE
Payer: MEDICARE

## 2022-01-19 ENCOUNTER — APPOINTMENT (OUTPATIENT)
Dept: GENERAL RADIOLOGY | Facility: HOSPITAL | Age: 82
End: 2022-01-19
Attending: EMERGENCY MEDICINE
Payer: MEDICARE

## 2022-01-19 DIAGNOSIS — E87.1 HYPONATREMIA: ICD-10-CM

## 2022-01-19 DIAGNOSIS — I95.9 HYPOTENSION, UNSPECIFIED HYPOTENSION TYPE: ICD-10-CM

## 2022-01-19 DIAGNOSIS — N30.00 ACUTE CYSTITIS WITHOUT HEMATURIA: ICD-10-CM

## 2022-01-19 DIAGNOSIS — R53.1 WEAKNESS GENERALIZED: ICD-10-CM

## 2022-01-19 DIAGNOSIS — U07.1 COVID-19: Primary | ICD-10-CM

## 2022-01-19 PROBLEM — R73.9 HYPERGLYCEMIA: Status: ACTIVE | Noted: 2022-01-19

## 2022-01-19 LAB
ALBUMIN SERPL-MCNC: 3 G/DL (ref 3.4–5)
ALBUMIN/GLOB SERPL: 0.9 {RATIO} (ref 1–2)
ALP LIVER SERPL-CCNC: 67 U/L
ALT SERPL-CCNC: 21 U/L
ANION GAP SERPL CALC-SCNC: 8 MMOL/L (ref 0–18)
AST SERPL-CCNC: 24 U/L (ref 15–37)
ATRIAL RATE: 60 BPM
BASOPHILS # BLD AUTO: 0.02 X10(3) UL (ref 0–0.2)
BASOPHILS NFR BLD AUTO: 0.2 %
BILIRUB SERPL-MCNC: 0.4 MG/DL (ref 0.1–2)
BILIRUB UR QL STRIP.AUTO: NEGATIVE
BUN BLD-MCNC: 16 MG/DL (ref 7–18)
CALCIUM BLD-MCNC: 8.7 MG/DL (ref 8.5–10.1)
CHLORIDE SERPL-SCNC: 101 MMOL/L (ref 98–112)
CO2 SERPL-SCNC: 22 MMOL/L (ref 21–32)
COLOR UR AUTO: YELLOW
CREAT BLD-MCNC: 1.08 MG/DL
EOSINOPHIL # BLD AUTO: 0.06 X10(3) UL (ref 0–0.7)
EOSINOPHIL NFR BLD AUTO: 0.7 %
ERYTHROCYTE [DISTWIDTH] IN BLOOD BY AUTOMATED COUNT: 12.4 %
GLOBULIN PLAS-MCNC: 3.3 G/DL (ref 2.8–4.4)
GLUCOSE BLD-MCNC: 180 MG/DL (ref 70–99)
GLUCOSE UR STRIP.AUTO-MCNC: NEGATIVE MG/DL
HCT VFR BLD AUTO: 37.3 %
HGB BLD-MCNC: 12.7 G/DL
HYALINE CASTS #/AREA URNS AUTO: PRESENT /LPF
IMM GRANULOCYTES # BLD AUTO: 0.04 X10(3) UL (ref 0–1)
IMM GRANULOCYTES NFR BLD: 0.5 %
KETONES UR STRIP.AUTO-MCNC: NEGATIVE MG/DL
LACTATE SERPL-SCNC: 1.6 MMOL/L (ref 0.4–2)
LYMPHOCYTES # BLD AUTO: 0.87 X10(3) UL (ref 1–4)
LYMPHOCYTES NFR BLD AUTO: 10.4 %
MCH RBC QN AUTO: 32.7 PG (ref 26–34)
MCHC RBC AUTO-ENTMCNC: 34 G/DL (ref 31–37)
MCV RBC AUTO: 96.1 FL
MONOCYTES # BLD AUTO: 0.54 X10(3) UL (ref 0.1–1)
MONOCYTES NFR BLD AUTO: 6.4 %
NEUTROPHILS # BLD AUTO: 6.85 X10 (3) UL (ref 1.5–7.7)
NEUTROPHILS # BLD AUTO: 6.85 X10(3) UL (ref 1.5–7.7)
NEUTROPHILS NFR BLD AUTO: 81.8 %
NITRITE UR QL STRIP.AUTO: NEGATIVE
OSMOLALITY SERPL CALC.SUM OF ELEC: 278 MOSM/KG (ref 275–295)
P AXIS: 19 DEGREES
P-R INTERVAL: 220 MS
PH UR STRIP.AUTO: 5 [PH] (ref 5–8)
PLATELET # BLD AUTO: 106 10(3)UL (ref 150–450)
POTASSIUM SERPL-SCNC: 4 MMOL/L (ref 3.5–5.1)
PROCALCITONIN SERPL-MCNC: <0.05 NG/ML (ref ?–0.16)
PROT SERPL-MCNC: 6.3 G/DL (ref 6.4–8.2)
PROT UR STRIP.AUTO-MCNC: NEGATIVE MG/DL
Q-T INTERVAL: 476 MS
QRS DURATION: 172 MS
QTC CALCULATION (BEZET): 476 MS
R AXIS: -34 DEGREES
RBC # BLD AUTO: 3.88 X10(6)UL
RBC UR QL AUTO: NEGATIVE
SARS-COV-2 RNA RESP QL NAA+PROBE: DETECTED
SODIUM SERPL-SCNC: 131 MMOL/L (ref 136–145)
SP GR UR STRIP.AUTO: 1.01 (ref 1–1.03)
T AXIS: 51 DEGREES
TROPONIN I HIGH SENSITIVITY: 10 NG/L
UROBILINOGEN UR STRIP.AUTO-MCNC: <2 MG/DL
VENTRICULAR RATE: 60 BPM
WBC # BLD AUTO: 8.4 X10(3) UL (ref 4–11)

## 2022-01-19 PROCEDURE — 71045 X-RAY EXAM CHEST 1 VIEW: CPT | Performed by: EMERGENCY MEDICINE

## 2022-01-19 PROCEDURE — 87088 URINE BACTERIA CULTURE: CPT | Performed by: EMERGENCY MEDICINE

## 2022-01-19 PROCEDURE — 87086 URINE CULTURE/COLONY COUNT: CPT | Performed by: EMERGENCY MEDICINE

## 2022-01-19 PROCEDURE — 81001 URINALYSIS AUTO W/SCOPE: CPT | Performed by: EMERGENCY MEDICINE

## 2022-01-19 PROCEDURE — 87186 SC STD MICRODIL/AGAR DIL: CPT | Performed by: EMERGENCY MEDICINE

## 2022-01-19 PROCEDURE — 84145 PROCALCITONIN (PCT): CPT | Performed by: EMERGENCY MEDICINE

## 2022-01-19 PROCEDURE — 96361 HYDRATE IV INFUSION ADD-ON: CPT

## 2022-01-19 PROCEDURE — 93010 ELECTROCARDIOGRAM REPORT: CPT

## 2022-01-19 PROCEDURE — 84484 ASSAY OF TROPONIN QUANT: CPT | Performed by: EMERGENCY MEDICINE

## 2022-01-19 PROCEDURE — 99285 EMERGENCY DEPT VISIT HI MDM: CPT

## 2022-01-19 PROCEDURE — 96365 THER/PROPH/DIAG IV INF INIT: CPT

## 2022-01-19 PROCEDURE — 80053 COMPREHEN METABOLIC PANEL: CPT | Performed by: EMERGENCY MEDICINE

## 2022-01-19 PROCEDURE — 36415 COLL VENOUS BLD VENIPUNCTURE: CPT

## 2022-01-19 PROCEDURE — 83605 ASSAY OF LACTIC ACID: CPT | Performed by: EMERGENCY MEDICINE

## 2022-01-19 PROCEDURE — 93005 ELECTROCARDIOGRAM TRACING: CPT

## 2022-01-19 PROCEDURE — 87040 BLOOD CULTURE FOR BACTERIA: CPT | Performed by: EMERGENCY MEDICINE

## 2022-01-19 PROCEDURE — 85025 COMPLETE CBC W/AUTO DIFF WBC: CPT | Performed by: EMERGENCY MEDICINE

## 2022-01-19 RX ORDER — ENOXAPARIN SODIUM 100 MG/ML
40 INJECTION SUBCUTANEOUS DAILY
Status: DISCONTINUED | OUTPATIENT
Start: 2022-01-20 | End: 2022-01-21

## 2022-01-19 RX ORDER — ACETAMINOPHEN 325 MG/1
650 TABLET ORAL EVERY 6 HOURS PRN
Status: DISCONTINUED | OUTPATIENT
Start: 2022-01-19 | End: 2022-01-21

## 2022-01-19 RX ORDER — TRAZODONE HYDROCHLORIDE 50 MG/1
50 TABLET ORAL NIGHTLY
Status: DISCONTINUED | OUTPATIENT
Start: 2022-01-19 | End: 2022-01-21

## 2022-01-19 RX ORDER — ASPIRIN 325 MG
325 TABLET ORAL DAILY
Status: DISCONTINUED | OUTPATIENT
Start: 2022-01-20 | End: 2022-01-21

## 2022-01-19 RX ORDER — ATORVASTATIN CALCIUM 10 MG/1
10 TABLET, FILM COATED ORAL NIGHTLY
Refills: 3 | Status: DISCONTINUED | OUTPATIENT
Start: 2022-01-19 | End: 2022-01-21

## 2022-01-19 RX ORDER — TAMSULOSIN HYDROCHLORIDE 0.4 MG/1
0.4 CAPSULE ORAL DAILY
Status: DISCONTINUED | OUTPATIENT
Start: 2022-01-20 | End: 2022-01-21

## 2022-01-19 RX ORDER — METOCLOPRAMIDE HYDROCHLORIDE 5 MG/ML
10 INJECTION INTRAMUSCULAR; INTRAVENOUS EVERY 8 HOURS PRN
Status: DISCONTINUED | OUTPATIENT
Start: 2022-01-19 | End: 2022-01-21

## 2022-01-19 RX ORDER — ONDANSETRON 2 MG/ML
4 INJECTION INTRAMUSCULAR; INTRAVENOUS EVERY 6 HOURS PRN
Status: DISCONTINUED | OUTPATIENT
Start: 2022-01-19 | End: 2022-01-21

## 2022-01-19 RX ORDER — CLOPIDOGREL BISULFATE 75 MG/1
75 TABLET ORAL DAILY
Status: DISCONTINUED | OUTPATIENT
Start: 2022-01-20 | End: 2022-01-21

## 2022-01-19 RX ORDER — ALBUTEROL SULFATE 90 UG/1
4 AEROSOL, METERED RESPIRATORY (INHALATION) EVERY 4 HOURS PRN
Status: DISCONTINUED | OUTPATIENT
Start: 2022-01-19 | End: 2022-01-21

## 2022-01-19 RX ORDER — GABAPENTIN 300 MG/1
600 CAPSULE ORAL 3 TIMES DAILY
Status: DISCONTINUED | OUTPATIENT
Start: 2022-01-19 | End: 2022-01-21

## 2022-01-19 RX ORDER — MELATONIN
3 NIGHTLY PRN
Status: DISCONTINUED | OUTPATIENT
Start: 2022-01-19 | End: 2022-01-21

## 2022-01-19 NOTE — ED INITIAL ASSESSMENT (HPI)
Pt arrives via EMS from [facility]. Pt c/o fever, onset last night, given 1g of tylenol this morning.  Pt also c/o diarrhea that started today, pt has pending covid test. Pt had syncopal episode while getting off the toilet, no trauma,

## 2022-01-19 NOTE — ED QUICK NOTES
Orders for admission, patient is aware of plan and ready to go upstairs. Any questions, please call ED RN pam  at extension [phone]. Vaccinated?  yes  Type of COVID test sent: rapid  COVID Suspicion level: Low      Titratable drug(s) infusing:  Rate:

## 2022-01-19 NOTE — ED PROVIDER NOTES
Patient Seen in: [facility] Emergency Department      History   Patient presents with:  Nausea/Vomiting/Diarrhea  Syncope    Stated Complaint: fever, diarrhea, syncope    Subjective:   HPI    Patient presents with diarrhea and syncope.   The patient 8/8/2014    Procedure: LUMBAR EPIDURAL;  Surgeon: Meryl Greene MD;  Location: [address] [place] [address]   • FLUOR GID & Jessica Likes NDL/CATH SPI DX/THER Lonniechay Reyes Keira 82  9/15/2014    Procedure: ;  Surgeon: Meryl Greene MD;  Location: [address] [place] [address] ANGIOGRAM CEREBRAL CAROTID UNILATERAL  2/7/2020        • IR INTRA ARTERIAL STROKE INTERVENTION  2/7/2020        • OTHER SURGICAL HISTORY      hand surgery right tendon   • OTHER SURGICAL HISTORY  1/16/2013    right hip ORIF of intertrochanteric fx by Dr. Lina Flores Surgeon: Nicole Montoya MD;  Location: [facility] FOR PAIN MANAGEMENT   • PATIENT [address] FOR IV ANTIBIOTIC SURGICAL SITE INFECTION PROPHYLAXIS.  N/A 8/8/2014    Procedure: LUMBAR EPIDURAL;  Surgeon: Nicole Montoya MD;  Location: [facility] tenderness. Extremities: No CCE. Skin: Warm and dry. Neurologic: Moving all extremities to command-left side slightly weaker than the right chronically.     ED Course     Labs Reviewed   COMP METABOLIC PANEL (14) - Abnormal; Notable for the following com 60  Rhythm: Sinus rhythm with first-degree AV block with PACs  Reading: Left axis deviation, left bundle branch block-old         XR CHEST AP PORTABLE  (CPT=71045)    Result Date: 1/19/2022  PROCEDURE:  XR CHEST AP PORTABLE  (CPT=71045)  TECHNIQUE:  AP marilee disposition: 1/19/2022  5:29 PM          Monoclonal Ab Discussion  The patient has been deemed a candidate for monoclonal antibody infusion. They have had mild symptoms for 2 days.  They do not require oxygen or hospitalization and have the following risks

## 2022-01-20 ENCOUNTER — APPOINTMENT (OUTPATIENT)
Dept: CV DIAGNOSTICS | Facility: HOSPITAL | Age: 82
End: 2022-01-20
Attending: INTERNAL MEDICINE
Payer: MEDICARE

## 2022-01-20 LAB
ALBUMIN SERPL-MCNC: 2.8 G/DL (ref 3.4–5)
ALBUMIN/GLOB SERPL: 1 {RATIO} (ref 1–2)
ALP LIVER SERPL-CCNC: 63 U/L
ALT SERPL-CCNC: 17 U/L
ANION GAP SERPL CALC-SCNC: 8 MMOL/L (ref 0–18)
AST SERPL-CCNC: 16 U/L (ref 15–37)
BASOPHILS # BLD AUTO: 0.03 X10(3) UL (ref 0–0.2)
BASOPHILS NFR BLD AUTO: 0.5 %
BILIRUB SERPL-MCNC: 0.4 MG/DL (ref 0.1–2)
BUN BLD-MCNC: 12 MG/DL (ref 7–18)
CALCIUM BLD-MCNC: 8.3 MG/DL (ref 8.5–10.1)
CHLORIDE SERPL-SCNC: 105 MMOL/L (ref 98–112)
CO2 SERPL-SCNC: 24 MMOL/L (ref 21–32)
CREAT BLD-MCNC: 0.73 MG/DL
CRP SERPL-MCNC: 9.16 MG/DL (ref ?–0.3)
D DIMER PPP FEU-MCNC: 0.75 UG/ML FEU (ref ?–0.83)
DEPRECATED HBV CORE AB SER IA-ACNC: 231.8 NG/ML
EOSINOPHIL # BLD AUTO: 0.26 X10(3) UL (ref 0–0.7)
EOSINOPHIL NFR BLD AUTO: 4.1 %
ERYTHROCYTE [DISTWIDTH] IN BLOOD BY AUTOMATED COUNT: 12.4 %
GLOBULIN PLAS-MCNC: 2.7 G/DL (ref 2.8–4.4)
GLUCOSE BLD-MCNC: 80 MG/DL (ref 70–99)
HCT VFR BLD AUTO: 36.4 %
HGB BLD-MCNC: 12.3 G/DL
IMM GRANULOCYTES # BLD AUTO: 0.02 X10(3) UL (ref 0–1)
IMM GRANULOCYTES NFR BLD: 0.3 %
LDH SERPL L TO P-CCNC: 177 U/L
LYMPHOCYTES # BLD AUTO: 0.98 X10(3) UL (ref 1–4)
LYMPHOCYTES NFR BLD AUTO: 15.3 %
MCH RBC QN AUTO: 32.6 PG (ref 26–34)
MCHC RBC AUTO-ENTMCNC: 33.8 G/DL (ref 31–37)
MCV RBC AUTO: 96.6 FL
MONOCYTES # BLD AUTO: 0.56 X10(3) UL (ref 0.1–1)
MONOCYTES NFR BLD AUTO: 8.7 %
NEUTROPHILS # BLD AUTO: 4.56 X10 (3) UL (ref 1.5–7.7)
NEUTROPHILS # BLD AUTO: 4.56 X10(3) UL (ref 1.5–7.7)
NEUTROPHILS NFR BLD AUTO: 71.1 %
OSMOLALITY SERPL CALC.SUM OF ELEC: 283 MOSM/KG (ref 275–295)
PLATELET # BLD AUTO: 108 10(3)UL (ref 150–450)
POTASSIUM SERPL-SCNC: 4 MMOL/L (ref 3.5–5.1)
PROT SERPL-MCNC: 5.5 G/DL (ref 6.4–8.2)
RBC # BLD AUTO: 3.77 X10(6)UL
SODIUM SERPL-SCNC: 137 MMOL/L (ref 136–145)
WBC # BLD AUTO: 6.4 X10(3) UL (ref 4–11)

## 2022-01-20 PROCEDURE — 97530 THERAPEUTIC ACTIVITIES: CPT

## 2022-01-20 PROCEDURE — 85025 COMPLETE CBC W/AUTO DIFF WBC: CPT | Performed by: INTERNAL MEDICINE

## 2022-01-20 PROCEDURE — 86140 C-REACTIVE PROTEIN: CPT | Performed by: INTERNAL MEDICINE

## 2022-01-20 PROCEDURE — 82728 ASSAY OF FERRITIN: CPT | Performed by: INTERNAL MEDICINE

## 2022-01-20 PROCEDURE — 83615 LACTATE (LD) (LDH) ENZYME: CPT | Performed by: INTERNAL MEDICINE

## 2022-01-20 PROCEDURE — 93306 TTE W/DOPPLER COMPLETE: CPT | Performed by: INTERNAL MEDICINE

## 2022-01-20 PROCEDURE — 85379 FIBRIN DEGRADATION QUANT: CPT | Performed by: INTERNAL MEDICINE

## 2022-01-20 PROCEDURE — 96376 TX/PRO/DX INJ SAME DRUG ADON: CPT

## 2022-01-20 PROCEDURE — 97161 PT EVAL LOW COMPLEX 20 MIN: CPT

## 2022-01-20 PROCEDURE — 80053 COMPREHEN METABOLIC PANEL: CPT | Performed by: INTERNAL MEDICINE

## 2022-01-20 NOTE — CM/SW NOTE
The [facility] can accept pt for RINKU. They will take pt with +Covid since he is from [facility]. The [facility] reserved in [address]. Spoke with pt who reluctantly agreed to go to [facility] for RINKU. Pt not yet medically cleared for dc.

## 2022-01-20 NOTE — CM/SW NOTE
Pt is an 77 yo male admitted for Covid. Pt is Covid+. Pt lives at [facility] with his wife and caregiver. Pt has a history of 2 CVAs. He is having some episodes of syncope. Pt is on room air. He is wheelchair bound at baseline.   PT saw pt and th

## 2022-01-20 NOTE — CM/SW NOTE
This writer notified that PT is recommending RINKU roberts referrals started for covid+ facilities. Pt is from [address] included in referral.     Assigned CM/SW to follow up with pt/family on further discharge planning.      Keara 106, LSW

## 2022-01-20 NOTE — CONSULTS
INFECTIOUS DISEASE [address] [facility] [place] Patient Status:  Observation    1940 MRN [id number]   [facility] 5NW-A Attending Nirmal Simms, [address] [facility] Day # 0 ELIANA Collazo Marielos Schreiber MD;  Location: [address] [place] [address]   • HERNIA SURGERY     • INJECTION, ANESTHETIC/STEROID, TRANSFORAMINAL EPIDURAL; LUMBAR/SACRAL, ADD'L LEVEL Right 3/9/2016    Procedure: TRANSFORAMINAL EPIDURAL - LUMBAR;  Surgeon: Marielos Schreiber Procedure: LUMBAR LAMINECTOMY 3 LEVEL;  Surgeon: Nola Russell MD;  Location: [address] [place] [address]   • PATIENT DOCUMENTED NOT TO HAVE EXPERIENCED ANY OF THE FOLLOWING EVENTS  10/4/2013    Procedure: LUMBAR EPIDURAL;  Surgeon: Jenifer Trimble MD;  Location: [facility] BG PROPHYLAXIS. 9/15/2014    Procedure: ;  Surgeon: Enzo Daugherty MD;  Location: [facility] FOR PAIN MANAGEMENT   • PATIENT [address] FOR IV ANTIBIOTIC SURGICAL SITE INFECTION PROPHYLAXIS.  N/A 11/10/2014    Procedure: LUMBAR EPIDURAL;  Michael current facility-administered medications on file prior to encounter. Calcium Carbonate-Vitamin D 250-125 MG-UNIT Oral Tab, Take 2 tablets by mouth daily. , Disp: 60 tablet, Rfl: 0  AZELASTINE  MCG/SPRAY Nasal Solution, inhale 2 sprays by Nasal rout Complex Vitamins (VITAMIN B COMPLEX) Oral Tab, Take 1 tablet by mouth daily. , Disp: , Rfl:   Vitamin D3 (VITAMIN D3) 1000 UNITS Oral Tab, Take 1,000 Units by mouth daily. , Disp: , Rfl:   lidocaine 4 % External Patch, Place 1 patch onto the skin Q24H PRN. , List:     BENIGN HYPERTENSION     Esophageal reflux     Benign non-nodular prostatic hyperplasia with lower urinary tract symptoms     Weakness of both legs     Gait abnormality     Spinal stenosis of lumbar region with neurogenic claudication     ALCOHOL

## 2022-01-20 NOTE — PHYSICAL THERAPY NOTE
PHYSICAL THERAPY EVALUATION - INPATIENT     Room Number: 773/631-B  Evaluation Date: 1/20/2022  Type of Evaluation: Initial  Physician Order: PT Eval and Treat    Presenting Problem: COVID 19  Co-Morbidities : htn, right cva with left hemiparesis, mu ILF)    PLAN  PT Treatment Plan: Bed mobility; Endurance; Energy conservation;Patient education;Range of motion;Strengthening;Transfer training  Rehab Potential : Good  Frequency (Obs): 5x/week  Number of Visits to Meet Established Goals: 5      CURRENT GOAL within functional limits passively left UE with approx 50% active shldr AROM, right UE strength 4/5, left grossly 3-/5    Lower extremity ROM is within functional limits     Lower extremity strength is within functional limits except for the following: scooting to eob   Sit to supine: NT     Transfer Mobility:  Sit to stand: max a from raised bed, second attempt mod a of two   Stand to sit: as above, pt with dec ability to control descent, requires assist  Gait = mod a of two to amb approx 2' with rw bed

## 2022-01-20 NOTE — PLAN OF CARE
COVID-19 Daily Discharge Readiness-Nursing    O2 Sat at Rest:  SPO2% on Room Air at Rest: 96  %   O2 Sat with Exertion:   93 % on  0  liters   Temperature max from last 24 hrs: Temp (24hrs), Av.4 °F (36.9 °C), Min:98 °F (36.7 °C), Max:98.7 °F (37.1 °C)

## 2022-01-20 NOTE — PLAN OF CARE
Problem: Patient/Family Goals  Goal: Patient/Family Long Term Goal  Description: Patient's Long Term Goal: discharge home with adequate resources    Interventions:  - follow poc: IV abx, continuous monitoring   - See additional Care Plan goals for specif

## 2022-01-20 NOTE — PROGRESS NOTES
[facility] Hospitalist Team  Progress Note      Tish Miguel  7/21/1940    Assessment/Plan:         #Genearlized weakness - likely 2/2 covid infection  -no hypoxia or respiratory symptoms  -he was given MAB in er, planned to go home but was later admitted  -ID Oral Daily   • clopidogrel  75 mg Oral Daily   • gabapentin  600 mg Oral TID   • melatonin  10 mg Oral Nightly   • atorvastatin  10 mg Oral Nightly   • tamsulosin  0.4 mg Oral Daily   • traZODone  50 mg Oral Nightly     Continuous Infusions:   PRN: sodium

## 2022-01-20 NOTE — COVID NURSING ASSESSMENT
COVID-19 Daily Discharge Readiness-Nursing    O2 Sat at Rest:     94% on Room air  Temperature max from last 24 hrs: Temp (24hrs), Av.3 °F (36.8 °C), Min:98 °F (36.7 °C), Max:98.7 °F (37.1 °C)    Inflammatory Markers: No results for input(s): CRP, CHARLI,

## 2022-01-20 NOTE — PROGRESS NOTES
NURSING ADMISSION NOTE      Patient admitted via Cart  Oriented to room. Safety precautions initiated. Bed in low position. Call light in reach. Pt navigator complete. Pt A&Ox4. VSS. Room air. Tele,  placed on pt. Pt updated with poc.  No fur

## 2022-01-21 VITALS
BODY MASS INDEX: 31.83 KG/M2 | TEMPERATURE: 98 F | HEART RATE: 61 BPM | SYSTOLIC BLOOD PRESSURE: 149 MMHG | RESPIRATION RATE: 20 BRPM | DIASTOLIC BLOOD PRESSURE: 54 MMHG | WEIGHT: 202.81 LBS | OXYGEN SATURATION: 95 % | HEIGHT: 67 IN

## 2022-01-21 LAB
ALBUMIN SERPL-MCNC: 2.7 G/DL (ref 3.4–5)
ALBUMIN/GLOB SERPL: 1 {RATIO} (ref 1–2)
ALP LIVER SERPL-CCNC: 65 U/L
ALT SERPL-CCNC: 17 U/L
ANION GAP SERPL CALC-SCNC: 8 MMOL/L (ref 0–18)
AST SERPL-CCNC: 18 U/L (ref 15–37)
BASOPHILS # BLD AUTO: 0.02 X10(3) UL (ref 0–0.2)
BASOPHILS NFR BLD AUTO: 0.3 %
BILIRUB SERPL-MCNC: 0.5 MG/DL (ref 0.1–2)
BUN BLD-MCNC: 16 MG/DL (ref 7–18)
CALCIUM BLD-MCNC: 8.4 MG/DL (ref 8.5–10.1)
CHLORIDE SERPL-SCNC: 104 MMOL/L (ref 98–112)
CO2 SERPL-SCNC: 24 MMOL/L (ref 21–32)
CREAT BLD-MCNC: 0.74 MG/DL
CRP SERPL-MCNC: 6.85 MG/DL (ref ?–0.3)
D DIMER PPP FEU-MCNC: 0.73 UG/ML FEU (ref ?–0.83)
DEPRECATED HBV CORE AB SER IA-ACNC: 234.1 NG/ML
EOSINOPHIL # BLD AUTO: 0.45 X10(3) UL (ref 0–0.7)
EOSINOPHIL NFR BLD AUTO: 7.6 %
ERYTHROCYTE [DISTWIDTH] IN BLOOD BY AUTOMATED COUNT: 12.2 %
GLOBULIN PLAS-MCNC: 2.8 G/DL (ref 2.8–4.4)
GLUCOSE BLD-MCNC: 77 MG/DL (ref 70–99)
HCT VFR BLD AUTO: 35.2 %
HGB BLD-MCNC: 11.6 G/DL
IMM GRANULOCYTES # BLD AUTO: 0.02 X10(3) UL (ref 0–1)
IMM GRANULOCYTES NFR BLD: 0.3 %
LDH SERPL L TO P-CCNC: 164 U/L
LYMPHOCYTES # BLD AUTO: 0.98 X10(3) UL (ref 1–4)
LYMPHOCYTES NFR BLD AUTO: 16.6 %
MCH RBC QN AUTO: 31.7 PG (ref 26–34)
MCHC RBC AUTO-ENTMCNC: 33 G/DL (ref 31–37)
MCV RBC AUTO: 96.2 FL
MONOCYTES # BLD AUTO: 0.59 X10(3) UL (ref 0.1–1)
MONOCYTES NFR BLD AUTO: 10 %
NEUTROPHILS # BLD AUTO: 3.84 X10 (3) UL (ref 1.5–7.7)
NEUTROPHILS # BLD AUTO: 3.84 X10(3) UL (ref 1.5–7.7)
NEUTROPHILS NFR BLD AUTO: 65.2 %
OSMOLALITY SERPL CALC.SUM OF ELEC: 282 MOSM/KG (ref 275–295)
PLATELET # BLD AUTO: 112 10(3)UL (ref 150–450)
POTASSIUM SERPL-SCNC: 4.3 MMOL/L (ref 3.5–5.1)
PROT SERPL-MCNC: 5.5 G/DL (ref 6.4–8.2)
RBC # BLD AUTO: 3.66 X10(6)UL
SODIUM SERPL-SCNC: 136 MMOL/L (ref 136–145)
WBC # BLD AUTO: 5.9 X10(3) UL (ref 4–11)

## 2022-01-21 PROCEDURE — 97535 SELF CARE MNGMENT TRAINING: CPT

## 2022-01-21 PROCEDURE — 83615 LACTATE (LD) (LDH) ENZYME: CPT | Performed by: INTERNAL MEDICINE

## 2022-01-21 PROCEDURE — 97166 OT EVAL MOD COMPLEX 45 MIN: CPT

## 2022-01-21 PROCEDURE — 85025 COMPLETE CBC W/AUTO DIFF WBC: CPT | Performed by: INTERNAL MEDICINE

## 2022-01-21 PROCEDURE — 80053 COMPREHEN METABOLIC PANEL: CPT | Performed by: INTERNAL MEDICINE

## 2022-01-21 PROCEDURE — 85379 FIBRIN DEGRADATION QUANT: CPT | Performed by: INTERNAL MEDICINE

## 2022-01-21 PROCEDURE — 86140 C-REACTIVE PROTEIN: CPT | Performed by: INTERNAL MEDICINE

## 2022-01-21 PROCEDURE — 97530 THERAPEUTIC ACTIVITIES: CPT

## 2022-01-21 PROCEDURE — 96372 THER/PROPH/DIAG INJ SC/IM: CPT

## 2022-01-21 PROCEDURE — 82728 ASSAY OF FERRITIN: CPT | Performed by: INTERNAL MEDICINE

## 2022-01-21 PROCEDURE — 97116 GAIT TRAINING THERAPY: CPT

## 2022-01-21 RX ORDER — CEPHALEXIN 500 MG/1
500 CAPSULE ORAL 3 TIMES DAILY
Qty: 12 CAPSULE | Refills: 0 | Status: SHIPPED | OUTPATIENT
Start: 2022-01-21 | End: 2022-01-25

## 2022-01-21 NOTE — DISCHARGE SUMMARY
General Medicine Discharge Summary     Patient ID:  Andrade Min  78year old  7/21/1940    Admit date: 1/19/2022    Discharge date and time: 1/21/2022  3:33 PM     Attending Physician: Kaitlin Anguiano and reconciled the discharge medications on the day of discharge.     Discharge Medication List as of 1/21/2022  3:15 PM    START taking these medications    cephalexin 500 MG Oral Cap  Take 1 capsule (500 mg total) by mouth 3 (three) times daily for 4 days Disp-90 tablet, R-3    Melatonin 10 MG Oral Tab  Take 10 mg by mouth every evening., Normal, Disp-90 tablet, R-0    Fluticasone Propionate 50 MCG/ACT Nasal Suspension  1 spray by Each Nare route daily. , Historical    artificial tears 83-15 % Ophthalmic Oin

## 2022-01-21 NOTE — OCCUPATIONAL THERAPY NOTE
OCCUPATIONAL THERAPY EVALUATION - INPATIENT     Room Number: 884/320-L  Evaluation Date: 1/21/2022  Type of Evaluation: Initial  Presenting Problem: COVID-19    Physician Order: IP Consult to Occupational Therapy  Reason for Therapy: ADL/IADL Dysfunction a Mobility:  Supine to Sit : Moderate assistance  Sit to Stand: Minimum assistance  Chair transfer: min A  Toilet/commode transfer: min A    Activity tolerance: tolerates > 15 min EOB sitting with stable o2 sats and BP.   Min c/o fatigue limits except left shoulder: 3/4 active d/t old stroke  Strength: is within functional limits except left UE: 3+/5 d/t old stroke  Coordination:  Gross motor: impaired  Fine motor: impaired Left hand d/t old stroke  Sensation: light touch intact; no c/o nu

## 2022-01-21 NOTE — CM/SW NOTE
01/21/22 0958   Discharge disposition   Post Acute Care Provider [facility]     Patient cleared medically for discharge. FAVIOLA reserved [facility] [place]. [facility] updated.  FAVIOLA set up ambulance service, 3pm. RN given nurse to nurse number  [phone]

## 2022-01-21 NOTE — PROGRESS NOTES
NURSING DISCHARGE NOTE    Discharged Nursing home via Ambulance. Accompanied by Support staff  Belongings Taken by patient/family. Last set of VS stable- See flowsheets. [facility]  Medically cleared by

## 2022-01-21 NOTE — PLAN OF CARE
Patient is A&Ox4, irritable, states he \"should not have come to the hospital because he is so uncomfortable. \" Pt repositioned for comfort, feels better. Agreeable to sit in the chair. Vitals stable, afebrile. Stable on RA at rest and with exertion.  NSR o

## 2022-01-21 NOTE — PHYSICAL THERAPY NOTE
PHYSICAL THERAPY TREATMENT NOTE - INPATIENT    Room Number: 576/002-E     Session: 1     Number of Visits to Meet Established Goals: 5    Presenting Problem: COVID 19  Co-Morbidities : htn, right cva with left hemiparesis, mult epidurals   History relat ASSESSMENT   Ratin  Location: denies at this time  Management Techniques:  Activity promotion    BALANCE                                                                                                                       Static Sitting: Fair  Dynamic but able to follow all commands, and with no confusion.     Therapist's Comments: Pt educated on role of PT, goals for session, pursed lip breathing, pacing, energy conservation, balancing rest with activity, importance of consistent mobility in his recover

## 2022-01-21 NOTE — COVID NURSING ASSESSMENT
COVID-19 Daily Discharge Readiness-Nursing    O2 Sat at Rest:  SPO2% on Room Air at Rest: 96  %   Temperature max from last 24 hrs: Temp (24hrs), Av.4 °F (36.9 °C), Min:97.9 °F (36.6 °C), Max:98.7 °F (37.1 °C)    Inflammatory Markers: Recent Labs   Lab

## 2022-01-30 ENCOUNTER — NURSE ONLY (OUTPATIENT)
Dept: LAB | Age: 82
End: 2022-01-30
Attending: FAMILY MEDICINE
Payer: MEDICARE

## 2022-01-30 DIAGNOSIS — F32.9 MAJOR DEPRESSIVE DISORDER: ICD-10-CM

## 2022-01-30 DIAGNOSIS — G81.14 SPASTIC HEMIPLEGIA AFFECTING LEFT NONDOMINANT SIDE (HCC): ICD-10-CM

## 2022-01-30 DIAGNOSIS — R26.2 DIFFICULTY IN WALKING, NOT ELSEWHERE CLASSIFIED: ICD-10-CM

## 2022-01-30 DIAGNOSIS — B96.20 ARTHRITIS DUE TO E. COLI (HCC): ICD-10-CM

## 2022-01-30 DIAGNOSIS — U07.1 COVID-19: ICD-10-CM

## 2022-01-30 DIAGNOSIS — M62.81 MUSCLE WEAKNESS: ICD-10-CM

## 2022-01-30 DIAGNOSIS — I44.7 LEFT BUNDLE BRANCH BLOCK: ICD-10-CM

## 2022-01-30 DIAGNOSIS — N30.00 ACUTE CYSTITIS WITHOUT HEMATURIA: Primary | ICD-10-CM

## 2022-01-30 DIAGNOSIS — I65.23 OCCLUSION AND STENOSIS OF BILATERAL CAROTID ARTERIES: ICD-10-CM

## 2022-01-30 DIAGNOSIS — M00.80 ARTHRITIS DUE TO E. COLI (HCC): ICD-10-CM

## 2022-01-30 DIAGNOSIS — Z86.73 PERSONAL HISTORY OF TIA (TRANSIENT ISCHEMIC ATTACK): ICD-10-CM

## 2022-01-30 LAB
ALBUMIN SERPL-MCNC: 2.9 G/DL (ref 3.4–5)
ALBUMIN/GLOB SERPL: 1.1 {RATIO} (ref 1–2)
ALP LIVER SERPL-CCNC: 54 U/L
ALT SERPL-CCNC: 17 U/L
ANION GAP SERPL CALC-SCNC: 2 MMOL/L (ref 0–18)
AST SERPL-CCNC: 15 U/L (ref 15–37)
BASOPHILS # BLD AUTO: 0.04 X10(3) UL (ref 0–0.2)
BASOPHILS NFR BLD AUTO: 0.7 %
BILIRUB SERPL-MCNC: 0.4 MG/DL (ref 0.1–2)
BUN BLD-MCNC: 15 MG/DL (ref 7–18)
CALCIUM BLD-MCNC: 8.7 MG/DL (ref 8.5–10.1)
CHLORIDE SERPL-SCNC: 106 MMOL/L (ref 98–112)
CO2 SERPL-SCNC: 31 MMOL/L (ref 21–32)
CREAT BLD-MCNC: 0.91 MG/DL
EOSINOPHIL # BLD AUTO: 0.45 X10(3) UL (ref 0–0.7)
EOSINOPHIL NFR BLD AUTO: 7.6 %
ERYTHROCYTE [DISTWIDTH] IN BLOOD BY AUTOMATED COUNT: 12.2 %
FASTING STATUS PATIENT QL REPORTED: YES
GLOBULIN PLAS-MCNC: 2.6 G/DL (ref 2.8–4.4)
GLUCOSE BLD-MCNC: 83 MG/DL (ref 70–99)
HCT VFR BLD AUTO: 34.2 %
HGB BLD-MCNC: 11.5 G/DL
IMM GRANULOCYTES # BLD AUTO: 0.06 X10(3) UL (ref 0–1)
IMM GRANULOCYTES NFR BLD: 1 %
LYMPHOCYTES # BLD AUTO: 1.61 X10(3) UL (ref 1–4)
LYMPHOCYTES NFR BLD AUTO: 27.1 %
MAGNESIUM SERPL-MCNC: 2.1 MG/DL (ref 1.6–2.6)
MCH RBC QN AUTO: 32.1 PG (ref 26–34)
MCHC RBC AUTO-ENTMCNC: 33.6 G/DL (ref 31–37)
MCV RBC AUTO: 95.5 FL
MONOCYTES # BLD AUTO: 0.65 X10(3) UL (ref 0.1–1)
MONOCYTES NFR BLD AUTO: 10.9 %
NEUTROPHILS # BLD AUTO: 3.13 X10 (3) UL (ref 1.5–7.7)
NEUTROPHILS # BLD AUTO: 3.13 X10(3) UL (ref 1.5–7.7)
NEUTROPHILS NFR BLD AUTO: 52.7 %
OSMOLALITY SERPL CALC.SUM OF ELEC: 288 MOSM/KG (ref 275–295)
PLATELET # BLD AUTO: 183 10(3)UL (ref 150–450)
POTASSIUM SERPL-SCNC: 4.6 MMOL/L (ref 3.5–5.1)
PROT SERPL-MCNC: 5.5 G/DL (ref 6.4–8.2)
RBC # BLD AUTO: 3.58 X10(6)UL
SODIUM SERPL-SCNC: 139 MMOL/L (ref 136–145)
VIT D+METAB SERPL-MCNC: 55.8 NG/ML (ref 30–100)
WBC # BLD AUTO: 5.9 X10(3) UL (ref 4–11)

## 2022-01-30 PROCEDURE — 85025 COMPLETE CBC W/AUTO DIFF WBC: CPT

## 2022-01-30 PROCEDURE — 82306 VITAMIN D 25 HYDROXY: CPT

## 2022-01-30 PROCEDURE — 83735 ASSAY OF MAGNESIUM: CPT

## 2022-01-30 PROCEDURE — 80053 COMPREHEN METABOLIC PANEL: CPT

## 2022-02-04 ENCOUNTER — NURSE ONLY (OUTPATIENT)
Dept: LAB | Age: 82
End: 2022-02-04
Attending: FAMILY MEDICINE
Payer: MEDICARE

## 2022-02-04 DIAGNOSIS — D64.9 ANEMIA, UNSPECIFIED: ICD-10-CM

## 2022-02-04 DIAGNOSIS — IMO0002 VITAMIN DISEASE: Primary | ICD-10-CM

## 2022-02-04 LAB
ALBUMIN SERPL-MCNC: 3.7 G/DL (ref 3.4–5)
ALBUMIN/GLOB SERPL: 1.2 {RATIO} (ref 1–2)
ALP LIVER SERPL-CCNC: 71 U/L
ALT SERPL-CCNC: 19 U/L
ANION GAP SERPL CALC-SCNC: 6 MMOL/L (ref 0–18)
AST SERPL-CCNC: 20 U/L (ref 15–37)
BASOPHILS # BLD AUTO: 0.07 X10(3) UL (ref 0–0.2)
BASOPHILS NFR BLD AUTO: 0.8 %
BILIRUB SERPL-MCNC: 0.6 MG/DL (ref 0.1–2)
BUN BLD-MCNC: 13 MG/DL (ref 7–18)
CALCIUM BLD-MCNC: 9.4 MG/DL (ref 8.5–10.1)
CHLORIDE SERPL-SCNC: 104 MMOL/L (ref 98–112)
CO2 SERPL-SCNC: 25 MMOL/L (ref 21–32)
CREAT BLD-MCNC: 0.9 MG/DL
EOSINOPHIL # BLD AUTO: 0.39 X10(3) UL (ref 0–0.7)
EOSINOPHIL NFR BLD AUTO: 4.7 %
ERYTHROCYTE [DISTWIDTH] IN BLOOD BY AUTOMATED COUNT: 12.3 %
FASTING STATUS PATIENT QL REPORTED: YES
GLOBULIN PLAS-MCNC: 3.1 G/DL (ref 2.8–4.4)
GLUCOSE BLD-MCNC: 89 MG/DL (ref 70–99)
HCT VFR BLD AUTO: 40.6 %
HGB BLD-MCNC: 13.2 G/DL
IMM GRANULOCYTES # BLD AUTO: 0.02 X10(3) UL (ref 0–1)
IMM GRANULOCYTES NFR BLD: 0.2 %
LYMPHOCYTES # BLD AUTO: 1.73 X10(3) UL (ref 1–4)
LYMPHOCYTES NFR BLD AUTO: 20.9 %
MCH RBC QN AUTO: 31.7 PG (ref 26–34)
MCHC RBC AUTO-ENTMCNC: 32.5 G/DL (ref 31–37)
MCV RBC AUTO: 97.4 FL
MONOCYTES # BLD AUTO: 0.93 X10(3) UL (ref 0.1–1)
MONOCYTES NFR BLD AUTO: 11.3 %
NEUTROPHILS # BLD AUTO: 5.12 X10 (3) UL (ref 1.5–7.7)
NEUTROPHILS # BLD AUTO: 5.12 X10(3) UL (ref 1.5–7.7)
NEUTROPHILS NFR BLD AUTO: 62.1 %
OSMOLALITY SERPL CALC.SUM OF ELEC: 280 MOSM/KG (ref 275–295)
PLATELET # BLD AUTO: 224 10(3)UL (ref 150–450)
POTASSIUM SERPL-SCNC: 4.8 MMOL/L (ref 3.5–5.1)
PROT SERPL-MCNC: 6.8 G/DL (ref 6.4–8.2)
RBC # BLD AUTO: 4.17 X10(6)UL
SODIUM SERPL-SCNC: 135 MMOL/L (ref 136–145)
WBC # BLD AUTO: 8.3 X10(3) UL (ref 4–11)

## 2022-02-04 PROCEDURE — 85025 COMPLETE CBC W/AUTO DIFF WBC: CPT

## 2022-02-04 PROCEDURE — 80053 COMPREHEN METABOLIC PANEL: CPT

## 2022-10-25 ENCOUNTER — IMMUNIZATION (OUTPATIENT)
Dept: INTERNAL MEDICINE CLINIC | Facility: CLINIC | Age: 82
End: 2022-10-25
Payer: MEDICARE

## 2022-10-25 DIAGNOSIS — Z23 NEED FOR VACCINATION: Primary | ICD-10-CM

## 2022-10-25 PROCEDURE — G0008 ADMIN INFLUENZA VIRUS VAC: HCPCS | Performed by: INTERNAL MEDICINE

## 2022-10-25 PROCEDURE — 90662 IIV NO PRSV INCREASED AG IM: CPT | Performed by: INTERNAL MEDICINE

## 2022-10-27 ENCOUNTER — TELEPHONE (OUTPATIENT)
Dept: INTERNAL MEDICINE CLINIC | Facility: CLINIC | Age: 82
End: 2022-10-27

## 2022-10-27 ENCOUNTER — OFFICE VISIT (OUTPATIENT)
Dept: INTERNAL MEDICINE CLINIC | Facility: CLINIC | Age: 82
End: 2022-10-27
Payer: MEDICARE

## 2022-10-27 VITALS
TEMPERATURE: 98 F | HEART RATE: 72 BPM | OXYGEN SATURATION: 100 % | SYSTOLIC BLOOD PRESSURE: 110 MMHG | RESPIRATION RATE: 17 BRPM | DIASTOLIC BLOOD PRESSURE: 76 MMHG

## 2022-10-27 DIAGNOSIS — T78.40XA ALLERGIC REACTION, INITIAL ENCOUNTER: Primary | ICD-10-CM

## 2022-10-27 DIAGNOSIS — I63.9 ACUTE ISCHEMIC STROKE (HCC): ICD-10-CM

## 2022-10-27 DIAGNOSIS — R53.1 WEAKNESS GENERALIZED: ICD-10-CM

## 2022-10-27 PROCEDURE — 99213 OFFICE O/P EST LOW 20 MIN: CPT | Performed by: INTERNAL MEDICINE

## 2022-10-27 RX ORDER — FEXOFENADINE HCL 180 MG/1
180 TABLET ORAL DAILY
Qty: 10 TABLET | Refills: 0 | Status: SHIPPED | OUTPATIENT
Start: 2022-10-27

## 2022-10-27 NOTE — TELEPHONE ENCOUNTER
Kaela Apple and his wife came to the office stating that the arm that Kaela Apple received his flu vaccine was red and warm to the touch. Per Dr Kathleen Camara patient was advised to take benadryl and place a cold pack on the arm. Wife insisted on Kaela Apple be seen today by a physician here in the office.  Dr Yang Silva will see him this afternoon at 1:15pm

## 2022-10-27 NOTE — PATIENT INSTRUCTIONS
Take one Allegra fexofenadtine 180 mg per day for 7 days or until redenss and swelling have resolved. Apply cool pack to left upper arm three times a day for 5-7 min    Call or return if symptoms worsen.

## 2023-01-01 ENCOUNTER — ANESTHESIA (OUTPATIENT)
Dept: EMERGENCY DEPT | Facility: HOSPITAL | Age: 83
DRG: 481 | End: 2023-01-01
Payer: MEDICARE

## 2023-01-01 ENCOUNTER — APPOINTMENT (OUTPATIENT)
Dept: GENERAL RADIOLOGY | Facility: HOSPITAL | Age: 83
DRG: 481 | End: 2023-01-01
Attending: STUDENT IN AN ORGANIZED HEALTH CARE EDUCATION/TRAINING PROGRAM
Payer: MEDICARE

## 2023-01-01 ENCOUNTER — ANESTHESIA (OUTPATIENT)
Dept: SURGERY | Facility: HOSPITAL | Age: 83
DRG: 481 | End: 2023-01-01
Payer: MEDICARE

## 2023-01-01 ENCOUNTER — APPOINTMENT (OUTPATIENT)
Dept: GENERAL RADIOLOGY | Facility: HOSPITAL | Age: 83
DRG: 481 | End: 2023-01-01
Attending: INTERNAL MEDICINE
Payer: MEDICARE

## 2023-01-01 ENCOUNTER — HOSPITAL ENCOUNTER (INPATIENT)
Facility: HOSPITAL | Age: 83
LOS: 2 days | DRG: 481 | End: 2023-01-01
Attending: STUDENT IN AN ORGANIZED HEALTH CARE EDUCATION/TRAINING PROGRAM | Admitting: HOSPITALIST
Payer: MEDICARE

## 2023-01-01 ENCOUNTER — ANESTHESIA EVENT (OUTPATIENT)
Dept: EMERGENCY DEPT | Facility: HOSPITAL | Age: 83
DRG: 481 | End: 2023-01-01
Payer: MEDICARE

## 2023-01-01 ENCOUNTER — APPOINTMENT (OUTPATIENT)
Dept: CV DIAGNOSTICS | Facility: HOSPITAL | Age: 83
DRG: 481 | End: 2023-01-01
Attending: HOSPITALIST
Payer: MEDICARE

## 2023-01-01 ENCOUNTER — ANESTHESIA EVENT (OUTPATIENT)
Dept: SURGERY | Facility: HOSPITAL | Age: 83
DRG: 481 | End: 2023-01-01
Payer: MEDICARE

## 2023-01-01 ENCOUNTER — APPOINTMENT (OUTPATIENT)
Dept: GENERAL RADIOLOGY | Facility: HOSPITAL | Age: 83
DRG: 481 | End: 2023-01-01
Attending: ORTHOPAEDIC SURGERY
Payer: MEDICARE

## 2023-01-01 VITALS
HEART RATE: 90 BPM | OXYGEN SATURATION: 97 % | SYSTOLIC BLOOD PRESSURE: 129 MMHG | DIASTOLIC BLOOD PRESSURE: 104 MMHG | RESPIRATION RATE: 16 BRPM | BODY MASS INDEX: 31.71 KG/M2 | HEIGHT: 67 IN | TEMPERATURE: 98 F | WEIGHT: 202 LBS

## 2023-01-01 DIAGNOSIS — S72.002A CLOSED FRACTURE OF NECK OF LEFT FEMUR, INITIAL ENCOUNTER (HCC): Primary | ICD-10-CM

## 2023-01-01 DIAGNOSIS — J84.9 INTERSTITIAL PULMONARY DISEASE (HCC): ICD-10-CM

## 2023-01-01 LAB
ANION GAP SERPL CALC-SCNC: 11 MMOL/L (ref 0–18)
ANION GAP SERPL CALC-SCNC: 3 MMOL/L (ref 0–18)
ANION GAP SERPL CALC-SCNC: 4 MMOL/L (ref 0–18)
ANION GAP SERPL CALC-SCNC: 6 MMOL/L (ref 0–18)
ATRIAL RATE: 78 BPM
BASOPHILS # BLD AUTO: 0.03 X10(3) UL (ref 0–0.2)
BASOPHILS # BLD AUTO: 0.04 X10(3) UL (ref 0–0.2)
BASOPHILS # BLD AUTO: 0.04 X10(3) UL (ref 0–0.2)
BASOPHILS # BLD AUTO: 0.05 X10(3) UL (ref 0–0.2)
BASOPHILS NFR BLD AUTO: 0.2 %
BASOPHILS NFR BLD AUTO: 0.2 %
BASOPHILS NFR BLD AUTO: 0.3 %
BASOPHILS NFR BLD AUTO: 0.5 %
BUN BLD-MCNC: 23 MG/DL (ref 7–18)
BUN BLD-MCNC: 26 MG/DL (ref 7–18)
BUN BLD-MCNC: 27 MG/DL (ref 7–18)
BUN BLD-MCNC: 28 MG/DL (ref 7–18)
CALCIUM BLD-MCNC: 7.5 MG/DL (ref 8.5–10.1)
CALCIUM BLD-MCNC: 8.2 MG/DL (ref 8.5–10.1)
CALCIUM BLD-MCNC: 8.4 MG/DL (ref 8.5–10.1)
CALCIUM BLD-MCNC: 8.5 MG/DL (ref 8.5–10.1)
CHLORIDE SERPL-SCNC: 104 MMOL/L (ref 98–112)
CHLORIDE SERPL-SCNC: 108 MMOL/L (ref 98–112)
CHLORIDE SERPL-SCNC: 109 MMOL/L (ref 98–112)
CHLORIDE SERPL-SCNC: 110 MMOL/L (ref 98–112)
CHOLEST SERPL-MCNC: 73 MG/DL (ref ?–200)
CO2 SERPL-SCNC: 14 MMOL/L (ref 21–32)
CO2 SERPL-SCNC: 21 MMOL/L (ref 21–32)
CO2 SERPL-SCNC: 24 MMOL/L (ref 21–32)
CO2 SERPL-SCNC: 25 MMOL/L (ref 21–32)
CREAT BLD-MCNC: 0.93 MG/DL
CREAT BLD-MCNC: 0.96 MG/DL
CREAT BLD-MCNC: 1.16 MG/DL
CREAT BLD-MCNC: 1.24 MG/DL
EOSINOPHIL # BLD AUTO: 0.01 X10(3) UL (ref 0–0.7)
EOSINOPHIL # BLD AUTO: 0.02 X10(3) UL (ref 0–0.7)
EOSINOPHIL # BLD AUTO: 0.13 X10(3) UL (ref 0–0.7)
EOSINOPHIL # BLD AUTO: 0.3 X10(3) UL (ref 0–0.7)
EOSINOPHIL NFR BLD AUTO: 0.1 %
EOSINOPHIL NFR BLD AUTO: 0.2 %
EOSINOPHIL NFR BLD AUTO: 0.7 %
EOSINOPHIL NFR BLD AUTO: 2.8 %
ERYTHROCYTE [DISTWIDTH] IN BLOOD BY AUTOMATED COUNT: 12.8 %
ERYTHROCYTE [DISTWIDTH] IN BLOOD BY AUTOMATED COUNT: 12.8 %
ERYTHROCYTE [DISTWIDTH] IN BLOOD BY AUTOMATED COUNT: 13.1 %
ERYTHROCYTE [DISTWIDTH] IN BLOOD BY AUTOMATED COUNT: 13.2 %
GFR SERPLBLD BASED ON 1.73 SQ M-ARVRAT: 57 ML/MIN/1.73M2 (ref 60–?)
GFR SERPLBLD BASED ON 1.73 SQ M-ARVRAT: 62 ML/MIN/1.73M2 (ref 60–?)
GFR SERPLBLD BASED ON 1.73 SQ M-ARVRAT: 78 ML/MIN/1.73M2 (ref 60–?)
GFR SERPLBLD BASED ON 1.73 SQ M-ARVRAT: 81 ML/MIN/1.73M2 (ref 60–?)
GLUCOSE BLD-MCNC: 129 MG/DL (ref 70–99)
GLUCOSE BLD-MCNC: 158 MG/DL (ref 70–99)
GLUCOSE BLD-MCNC: 160 MG/DL (ref 70–99)
GLUCOSE BLD-MCNC: 233 MG/DL (ref 70–99)
GLUCOSE BLD-MCNC: 245 MG/DL (ref 70–99)
HCT VFR BLD AUTO: 20.9 %
HCT VFR BLD AUTO: 24.5 %
HCT VFR BLD AUTO: 33.6 %
HCT VFR BLD AUTO: 34.4 %
HDLC SERPL-MCNC: 49 MG/DL (ref 40–59)
HGB BLD-MCNC: 11 G/DL
HGB BLD-MCNC: 11.7 G/DL
HGB BLD-MCNC: 6.3 G/DL
HGB BLD-MCNC: 7.9 G/DL
IMM GRANULOCYTES # BLD AUTO: 0.04 X10(3) UL (ref 0–1)
IMM GRANULOCYTES # BLD AUTO: 0.07 X10(3) UL (ref 0–1)
IMM GRANULOCYTES # BLD AUTO: 0.09 X10(3) UL (ref 0–1)
IMM GRANULOCYTES # BLD AUTO: 0.31 X10(3) UL (ref 0–1)
IMM GRANULOCYTES NFR BLD: 0.4 %
IMM GRANULOCYTES NFR BLD: 0.5 %
IMM GRANULOCYTES NFR BLD: 0.7 %
IMM GRANULOCYTES NFR BLD: 1.6 %
LDLC SERPL CALC-MCNC: 7 MG/DL (ref ?–100)
LYMPHOCYTES # BLD AUTO: 0.81 X10(3) UL (ref 1–4)
LYMPHOCYTES # BLD AUTO: 0.97 X10(3) UL (ref 1–4)
LYMPHOCYTES # BLD AUTO: 1.15 X10(3) UL (ref 1–4)
LYMPHOCYTES # BLD AUTO: 5.4 X10(3) UL (ref 1–4)
LYMPHOCYTES NFR BLD AUTO: 10.9 %
LYMPHOCYTES NFR BLD AUTO: 27.4 %
LYMPHOCYTES NFR BLD AUTO: 5.8 %
LYMPHOCYTES NFR BLD AUTO: 7.5 %
MAGNESIUM SERPL-MCNC: 2 MG/DL (ref 1.6–2.6)
MCH RBC QN AUTO: 32.2 PG (ref 26–34)
MCH RBC QN AUTO: 32.6 PG (ref 26–34)
MCH RBC QN AUTO: 32.9 PG (ref 26–34)
MCH RBC QN AUTO: 33.1 PG (ref 26–34)
MCHC RBC AUTO-ENTMCNC: 30.1 G/DL (ref 31–37)
MCHC RBC AUTO-ENTMCNC: 32.2 G/DL (ref 31–37)
MCHC RBC AUTO-ENTMCNC: 32.7 G/DL (ref 31–37)
MCHC RBC AUTO-ENTMCNC: 34 G/DL (ref 31–37)
MCV RBC AUTO: 102.1 FL
MCV RBC AUTO: 108.3 FL
MCV RBC AUTO: 97.5 FL
MCV RBC AUTO: 98.2 FL
MONOCYTES # BLD AUTO: 0.7 X10(3) UL (ref 0.1–1)
MONOCYTES # BLD AUTO: 0.72 X10(3) UL (ref 0.1–1)
MONOCYTES # BLD AUTO: 1.45 X10(3) UL (ref 0.1–1)
MONOCYTES # BLD AUTO: 1.99 X10(3) UL (ref 0.1–1)
MONOCYTES NFR BLD AUTO: 10.1 %
MONOCYTES NFR BLD AUTO: 10.5 %
MONOCYTES NFR BLD AUTO: 5.6 %
MONOCYTES NFR BLD AUTO: 6.6 %
MRSA NASAL: NEGATIVE
NEUTROPHILS # BLD AUTO: 11.1 X10 (3) UL (ref 1.5–7.7)
NEUTROPHILS # BLD AUTO: 11.1 X10(3) UL (ref 1.5–7.7)
NEUTROPHILS # BLD AUTO: 11.48 X10 (3) UL (ref 1.5–7.7)
NEUTROPHILS # BLD AUTO: 11.48 X10(3) UL (ref 1.5–7.7)
NEUTROPHILS # BLD AUTO: 11.83 X10 (3) UL (ref 1.5–7.7)
NEUTROPHILS # BLD AUTO: 11.83 X10(3) UL (ref 1.5–7.7)
NEUTROPHILS # BLD AUTO: 8.34 X10 (3) UL (ref 1.5–7.7)
NEUTROPHILS # BLD AUTO: 8.34 X10(3) UL (ref 1.5–7.7)
NEUTROPHILS NFR BLD AUTO: 60 %
NEUTROPHILS NFR BLD AUTO: 78.8 %
NEUTROPHILS NFR BLD AUTO: 82.8 %
NEUTROPHILS NFR BLD AUTO: 85.8 %
NONHDLC SERPL-MCNC: 24 MG/DL (ref ?–130)
NT-PROBNP SERPL-MCNC: 1112 PG/ML (ref ?–450)
NT-PROBNP SERPL-MCNC: 2759 PG/ML (ref ?–450)
OSMOLALITY SERPL CALC.SUM OF ELEC: 281 MOSM/KG (ref 275–295)
OSMOLALITY SERPL CALC.SUM OF ELEC: 287 MOSM/KG (ref 275–295)
OSMOLALITY SERPL CALC.SUM OF ELEC: 292 MOSM/KG (ref 275–295)
OSMOLALITY SERPL CALC.SUM OF ELEC: 293 MOSM/KG (ref 275–295)
P AXIS: 21 DEGREES
P-R INTERVAL: 208 MS
PLATELET # BLD AUTO: 110 10(3)UL (ref 150–450)
PLATELET # BLD AUTO: 133 10(3)UL (ref 150–450)
PLATELET # BLD AUTO: 144 10(3)UL (ref 150–450)
PLATELET # BLD AUTO: 152 10(3)UL (ref 150–450)
POTASSIUM SERPL-SCNC: 4.1 MMOL/L (ref 3.5–5.1)
POTASSIUM SERPL-SCNC: 4.2 MMOL/L (ref 3.5–5.1)
POTASSIUM SERPL-SCNC: 4.5 MMOL/L (ref 3.5–5.1)
POTASSIUM SERPL-SCNC: 4.7 MMOL/L (ref 3.5–5.1)
PROCALCITONIN SERPL-MCNC: <0.05 NG/ML (ref ?–0.16)
Q-T INTERVAL: 428 MS
QRS DURATION: 162 MS
QTC CALCULATION (BEZET): 487 MS
R AXIS: -29 DEGREES
RBC # BLD AUTO: 1.93 X10(6)UL
RBC # BLD AUTO: 2.4 X10(6)UL
RBC # BLD AUTO: 3.42 X10(6)UL
RBC # BLD AUTO: 3.53 X10(6)UL
SODIUM SERPL-SCNC: 129 MMOL/L (ref 136–145)
SODIUM SERPL-SCNC: 136 MMOL/L (ref 136–145)
SODIUM SERPL-SCNC: 137 MMOL/L (ref 136–145)
SODIUM SERPL-SCNC: 137 MMOL/L (ref 136–145)
STAPH A BY PCR: NEGATIVE
T AXIS: 126 DEGREES
TRIGL SERPL-MCNC: 81 MG/DL (ref 30–149)
TROPONIN I HIGH SENSITIVITY: 6131 NG/L
VENTRICULAR RATE: 78 BPM
VLDLC SERPL CALC-MCNC: 10 MG/DL (ref 0–30)
WBC # BLD AUTO: 10.6 X10(3) UL (ref 4–11)
WBC # BLD AUTO: 12.9 X10(3) UL (ref 4–11)
WBC # BLD AUTO: 13.9 X10(3) UL (ref 4–11)
WBC # BLD AUTO: 19.7 X10(3) UL (ref 4–11)

## 2023-01-01 PROCEDURE — 76942 ECHO GUIDE FOR BIOPSY: CPT | Performed by: ANESTHESIOLOGY

## 2023-01-01 PROCEDURE — 99291 CRITICAL CARE FIRST HOUR: CPT | Performed by: INTERNAL MEDICINE

## 2023-01-01 PROCEDURE — 93306 TTE W/DOPPLER COMPLETE: CPT | Performed by: HOSPITALIST

## 2023-01-01 PROCEDURE — BQ111ZZ FLUOROSCOPY OF LEFT HIP USING LOW OSMOLAR CONTRAST: ICD-10-PCS | Performed by: ORTHOPAEDIC SURGERY

## 2023-01-01 PROCEDURE — 0QS706Z REPOSITION LEFT UPPER FEMUR WITH INTRAMEDULLARY INTERNAL FIXATION DEVICE, OPEN APPROACH: ICD-10-PCS | Performed by: ORTHOPAEDIC SURGERY

## 2023-01-01 PROCEDURE — 5A2204Z RESTORATION OF CARDIAC RHYTHM, SINGLE: ICD-10-PCS | Performed by: INTERNAL MEDICINE

## 2023-01-01 PROCEDURE — 73552 X-RAY EXAM OF FEMUR 2/>: CPT | Performed by: STUDENT IN AN ORGANIZED HEALTH CARE EDUCATION/TRAINING PROGRAM

## 2023-01-01 PROCEDURE — 76000 FLUOROSCOPY <1 HR PHYS/QHP: CPT | Performed by: ORTHOPAEDIC SURGERY

## 2023-01-01 PROCEDURE — 71045 X-RAY EXAM CHEST 1 VIEW: CPT | Performed by: STUDENT IN AN ORGANIZED HEALTH CARE EDUCATION/TRAINING PROGRAM

## 2023-01-01 PROCEDURE — 73502 X-RAY EXAM HIP UNI 2-3 VIEWS: CPT | Performed by: STUDENT IN AN ORGANIZED HEALTH CARE EDUCATION/TRAINING PROGRAM

## 2023-01-01 DEVICE — ZNN CMN LAG SCREW 10.5X120
Type: IMPLANTABLE DEVICE | Site: LEG | Status: FUNCTIONAL
Brand: ZIMMER® NATURAL NAIL® SYSTEM

## 2023-01-01 DEVICE — ZNN CMN NAIL 11.5MMX40CM 125 L
Type: IMPLANTABLE DEVICE | Site: LEG | Status: FUNCTIONAL
Brand: ZIMMER® NATURAL NAIL® SYSTEM

## 2023-01-01 DEVICE — IMPLANTABLE DEVICE
Type: IMPLANTABLE DEVICE | Site: LEG | Status: FUNCTIONAL
Brand: NATURAL NAIL®

## 2023-01-01 RX ORDER — MORPHINE SULFATE 2 MG/ML
2 INJECTION, SOLUTION INTRAMUSCULAR; INTRAVENOUS EVERY 2 HOUR PRN
Status: DISCONTINUED | OUTPATIENT
Start: 2023-01-01 | End: 2023-04-29

## 2023-01-01 RX ORDER — MORPHINE SULFATE 2 MG/ML
0.5 INJECTION, SOLUTION INTRAMUSCULAR; INTRAVENOUS EVERY 2 HOUR PRN
Status: DISCONTINUED | OUTPATIENT
Start: 2023-01-01 | End: 2023-04-29

## 2023-01-01 RX ORDER — HYDROCODONE BITARTRATE AND ACETAMINOPHEN 5; 325 MG/1; MG/1
2 TABLET ORAL ONCE AS NEEDED
Status: DISCONTINUED | OUTPATIENT
Start: 2023-01-01 | End: 2023-01-01 | Stop reason: HOSPADM

## 2023-01-01 RX ORDER — TAMSULOSIN HYDROCHLORIDE 0.4 MG/1
0.4 CAPSULE ORAL DAILY
Status: DISCONTINUED | OUTPATIENT
Start: 2023-01-01 | End: 2023-01-01

## 2023-01-01 RX ORDER — LABETALOL HYDROCHLORIDE 5 MG/ML
5 INJECTION, SOLUTION INTRAVENOUS EVERY 5 MIN PRN
Status: DISCONTINUED | OUTPATIENT
Start: 2023-01-01 | End: 2023-01-01 | Stop reason: HOSPADM

## 2023-01-01 RX ORDER — ATORVASTATIN CALCIUM 10 MG/1
10 TABLET, FILM COATED ORAL NIGHTLY
Status: DISCONTINUED | OUTPATIENT
Start: 2023-01-01 | End: 2023-04-29

## 2023-01-01 RX ORDER — CEFAZOLIN SODIUM/WATER 2 G/20 ML
SYRINGE (ML) INTRAVENOUS AS NEEDED
Status: DISCONTINUED | OUTPATIENT
Start: 2023-01-01 | End: 2023-01-01 | Stop reason: SURG

## 2023-01-01 RX ORDER — BISACODYL 10 MG
10 SUPPOSITORY, RECTAL RECTAL
Status: DISCONTINUED | OUTPATIENT
Start: 2023-01-01 | End: 2023-01-01

## 2023-01-01 RX ORDER — HYDROMORPHONE HYDROCHLORIDE 1 MG/ML
0.4 INJECTION, SOLUTION INTRAMUSCULAR; INTRAVENOUS; SUBCUTANEOUS EVERY 2 HOUR PRN
Status: DISCONTINUED | OUTPATIENT
Start: 2023-01-01 | End: 2023-04-29

## 2023-01-01 RX ORDER — OXYCODONE HYDROCHLORIDE 5 MG/1
2.5 TABLET ORAL EVERY 4 HOURS PRN
Status: DISCONTINUED | OUTPATIENT
Start: 2023-01-01 | End: 2023-04-29

## 2023-01-01 RX ORDER — TAMSULOSIN HYDROCHLORIDE 0.4 MG/1
0.4 CAPSULE ORAL DAILY
Status: DISCONTINUED | OUTPATIENT
Start: 2023-01-01 | End: 2023-04-29

## 2023-01-01 RX ORDER — CALCIUM CARBONATE-CHOLECALCIFEROL TAB 250 MG-125 UNIT 250-125 MG-UNIT
2 TAB ORAL DAILY
Status: DISCONTINUED | OUTPATIENT
Start: 2023-01-01 | End: 2023-04-29

## 2023-01-01 RX ORDER — LIDOCAINE HYDROCHLORIDE 10 MG/ML
INJECTION, SOLUTION EPIDURAL; INFILTRATION; INTRACAUDAL; PERINEURAL AS NEEDED
Status: DISCONTINUED | OUTPATIENT
Start: 2023-01-01 | End: 2023-01-01

## 2023-01-01 RX ORDER — METOCLOPRAMIDE HYDROCHLORIDE 5 MG/ML
10 INJECTION INTRAMUSCULAR; INTRAVENOUS EVERY 8 HOURS PRN
Status: DISCONTINUED | OUTPATIENT
Start: 2023-01-01 | End: 2023-01-01 | Stop reason: HOSPADM

## 2023-01-01 RX ORDER — SODIUM CHLORIDE, SODIUM LACTATE, POTASSIUM CHLORIDE, CALCIUM CHLORIDE 600; 310; 30; 20 MG/100ML; MG/100ML; MG/100ML; MG/100ML
INJECTION, SOLUTION INTRAVENOUS CONTINUOUS
Status: DISCONTINUED | OUTPATIENT
Start: 2023-01-01 | End: 2023-01-01 | Stop reason: HOSPADM

## 2023-01-01 RX ORDER — MELATONIN
3 NIGHTLY PRN
Status: DISCONTINUED | OUTPATIENT
Start: 2023-01-01 | End: 2023-01-01

## 2023-01-01 RX ORDER — DOCUSATE SODIUM 100 MG/1
100 CAPSULE, LIQUID FILLED ORAL 2 TIMES DAILY
Status: DISCONTINUED | OUTPATIENT
Start: 2023-01-01 | End: 2023-04-29

## 2023-01-01 RX ORDER — MEPERIDINE HYDROCHLORIDE 25 MG/ML
12.5 INJECTION INTRAMUSCULAR; INTRAVENOUS; SUBCUTANEOUS AS NEEDED
Status: DISCONTINUED | OUTPATIENT
Start: 2023-01-01 | End: 2023-01-01 | Stop reason: HOSPADM

## 2023-01-01 RX ORDER — ONDANSETRON 2 MG/ML
4 INJECTION INTRAMUSCULAR; INTRAVENOUS EVERY 6 HOURS PRN
Status: DISCONTINUED | OUTPATIENT
Start: 2023-01-01 | End: 2023-01-01 | Stop reason: HOSPADM

## 2023-01-01 RX ORDER — GABAPENTIN 300 MG/1
600 CAPSULE ORAL 3 TIMES DAILY
Status: DISCONTINUED | OUTPATIENT
Start: 2023-01-01 | End: 2023-01-01

## 2023-01-01 RX ORDER — CALCIUM CARBONATE-CHOLECALCIFEROL TAB 250 MG-125 UNIT 250-125 MG-UNIT
2 TAB ORAL DAILY
Status: DISCONTINUED | OUTPATIENT
Start: 2023-01-01 | End: 2023-01-01

## 2023-01-01 RX ORDER — TRAMADOL HYDROCHLORIDE 50 MG/1
50 TABLET ORAL EVERY 6 HOURS PRN
Status: DISCONTINUED | OUTPATIENT
Start: 2023-01-01 | End: 2023-04-29

## 2023-01-01 RX ORDER — HYDROMORPHONE HYDROCHLORIDE 1 MG/ML
0.4 INJECTION, SOLUTION INTRAMUSCULAR; INTRAVENOUS; SUBCUTANEOUS EVERY 5 MIN PRN
Status: DISCONTINUED | OUTPATIENT
Start: 2023-01-01 | End: 2023-01-01 | Stop reason: HOSPADM

## 2023-01-01 RX ORDER — ROCURONIUM BROMIDE 10 MG/ML
INJECTION, SOLUTION INTRAVENOUS AS NEEDED
Status: DISCONTINUED | OUTPATIENT
Start: 2023-01-01 | End: 2023-01-01 | Stop reason: SURG

## 2023-01-01 RX ORDER — OXYCODONE HYDROCHLORIDE 5 MG/1
5 TABLET ORAL EVERY 6 HOURS PRN
Qty: 20 TABLET | Refills: 0 | Status: SHIPPED | OUTPATIENT
Start: 2023-01-01 | End: 2023-04-29

## 2023-01-01 RX ORDER — OXYBUTYNIN CHLORIDE 10 MG/1
10 TABLET, EXTENDED RELEASE ORAL DAILY
Status: DISCONTINUED | OUTPATIENT
Start: 2023-01-01 | End: 2023-04-29

## 2023-01-01 RX ORDER — MIDAZOLAM HYDROCHLORIDE 1 MG/ML
INJECTION INTRAMUSCULAR; INTRAVENOUS AS NEEDED
Status: DISCONTINUED | OUTPATIENT
Start: 2023-01-01 | End: 2023-01-01 | Stop reason: SURG

## 2023-01-01 RX ORDER — OXYCODONE HYDROCHLORIDE 5 MG/1
5 TABLET ORAL EVERY 4 HOURS PRN
Status: DISCONTINUED | OUTPATIENT
Start: 2023-01-01 | End: 2023-04-29

## 2023-01-01 RX ORDER — CEFAZOLIN SODIUM/WATER 2 G/20 ML
2 SYRINGE (ML) INTRAVENOUS EVERY 8 HOURS
Status: COMPLETED | OUTPATIENT
Start: 2023-01-01 | End: 2023-01-01

## 2023-01-01 RX ORDER — GUAIFENESIN 600 MG/1
600 TABLET, EXTENDED RELEASE ORAL 2 TIMES DAILY
Status: DISCONTINUED | OUTPATIENT
Start: 2023-01-01 | End: 2023-04-29

## 2023-01-01 RX ORDER — MINERAL OIL AND PETROLATUM 150; 830 MG/G; MG/G
1 OINTMENT OPHTHALMIC EVERY EVENING
Status: DISCONTINUED | OUTPATIENT
Start: 2023-01-01 | End: 2023-01-01

## 2023-01-01 RX ORDER — METOCLOPRAMIDE HYDROCHLORIDE 5 MG/ML
10 INJECTION INTRAMUSCULAR; INTRAVENOUS EVERY 8 HOURS PRN
Status: DISCONTINUED | OUTPATIENT
Start: 2023-01-01 | End: 2023-04-29

## 2023-01-01 RX ORDER — DEXAMETHASONE SODIUM PHOSPHATE 10 MG/ML
INJECTION, SOLUTION INTRAMUSCULAR; INTRAVENOUS AS NEEDED
Status: DISCONTINUED | OUTPATIENT
Start: 2023-01-01 | End: 2023-01-01 | Stop reason: SURG

## 2023-01-01 RX ORDER — METOCLOPRAMIDE HYDROCHLORIDE 5 MG/ML
10 INJECTION INTRAMUSCULAR; INTRAVENOUS EVERY 8 HOURS PRN
Status: DISCONTINUED | OUTPATIENT
Start: 2023-01-01 | End: 2023-01-01

## 2023-01-01 RX ORDER — ACETAMINOPHEN 500 MG
500 TABLET ORAL EVERY 4 HOURS PRN
Status: DISCONTINUED | OUTPATIENT
Start: 2023-01-01 | End: 2023-04-29

## 2023-01-01 RX ORDER — DOCUSATE SODIUM 100 MG/1
100 CAPSULE, LIQUID FILLED ORAL 2 TIMES DAILY PRN
COMMUNITY

## 2023-01-01 RX ORDER — ATORVASTATIN CALCIUM 10 MG/1
10 TABLET, FILM COATED ORAL NIGHTLY
Status: DISCONTINUED | OUTPATIENT
Start: 2023-01-01 | End: 2023-01-01

## 2023-01-01 RX ORDER — POLYETHYLENE GLYCOL 3350 17 G/17G
17 POWDER, FOR SOLUTION ORAL DAILY PRN
Status: DISCONTINUED | OUTPATIENT
Start: 2023-01-01 | End: 2023-01-01

## 2023-01-01 RX ORDER — ROPIVACAINE HYDROCHLORIDE 5 MG/ML
INJECTION, SOLUTION EPIDURAL; INFILTRATION; PERINEURAL AS NEEDED
Status: DISCONTINUED | OUTPATIENT
Start: 2023-01-01 | End: 2023-01-01

## 2023-01-01 RX ORDER — EPHEDRINE SULFATE 50 MG/ML
INJECTION INTRAVENOUS AS NEEDED
Status: DISCONTINUED | OUTPATIENT
Start: 2023-01-01 | End: 2023-01-01 | Stop reason: SURG

## 2023-01-01 RX ORDER — TRAMADOL HYDROCHLORIDE 50 MG/1
50 TABLET ORAL EVERY 6 HOURS PRN
Qty: 40 TABLET | Refills: 0 | Status: SHIPPED | OUTPATIENT
Start: 2023-01-01 | End: 2023-04-29

## 2023-01-01 RX ORDER — ACETAMINOPHEN 500 MG
1000 TABLET ORAL EVERY 8 HOURS SCHEDULED
Status: DISCONTINUED | OUTPATIENT
Start: 2023-01-01 | End: 2023-04-29

## 2023-01-01 RX ORDER — MELATONIN
1000 DAILY
Status: DISCONTINUED | OUTPATIENT
Start: 2023-01-01 | End: 2023-04-29

## 2023-01-01 RX ORDER — ACETAMINOPHEN 500 MG
1000 TABLET ORAL EVERY 8 HOURS SCHEDULED
Qty: 120 TABLET | Refills: 0 | Status: SHIPPED | OUTPATIENT
Start: 2023-01-01 | End: 2023-04-29

## 2023-01-01 RX ORDER — SENNOSIDES 8.6 MG
17.2 TABLET ORAL NIGHTLY PRN
Status: DISCONTINUED | OUTPATIENT
Start: 2023-01-01 | End: 2023-04-29

## 2023-01-01 RX ORDER — FAMOTIDINE 20 MG/1
20 TABLET, FILM COATED ORAL 2 TIMES DAILY
Status: DISCONTINUED | OUTPATIENT
Start: 2023-01-01 | End: 2023-01-01

## 2023-01-01 RX ORDER — DOXEPIN HYDROCHLORIDE 50 MG/1
1 CAPSULE ORAL DAILY
Status: DISCONTINUED | OUTPATIENT
Start: 2023-01-01 | End: 2023-04-29

## 2023-01-01 RX ORDER — SODIUM CHLORIDE, SODIUM LACTATE, POTASSIUM CHLORIDE, CALCIUM CHLORIDE 600; 310; 30; 20 MG/100ML; MG/100ML; MG/100ML; MG/100ML
INJECTION, SOLUTION INTRAVENOUS CONTINUOUS PRN
Status: DISCONTINUED | OUTPATIENT
Start: 2023-01-01 | End: 2023-01-01 | Stop reason: SURG

## 2023-01-01 RX ORDER — TRAZODONE HYDROCHLORIDE 50 MG/1
50 TABLET ORAL EVERY EVENING
Status: DISCONTINUED | OUTPATIENT
Start: 2023-01-01 | End: 2023-01-01

## 2023-01-01 RX ORDER — HYDROMORPHONE HYDROCHLORIDE 1 MG/ML
0.6 INJECTION, SOLUTION INTRAMUSCULAR; INTRAVENOUS; SUBCUTANEOUS EVERY 5 MIN PRN
Status: DISCONTINUED | OUTPATIENT
Start: 2023-01-01 | End: 2023-01-01 | Stop reason: HOSPADM

## 2023-01-01 RX ORDER — ACETAMINOPHEN 500 MG
1000 TABLET ORAL ONCE AS NEEDED
Status: DISCONTINUED | OUTPATIENT
Start: 2023-01-01 | End: 2023-01-01 | Stop reason: HOSPADM

## 2023-01-01 RX ORDER — ONDANSETRON 2 MG/ML
4 INJECTION INTRAMUSCULAR; INTRAVENOUS EVERY 6 HOURS PRN
Status: DISCONTINUED | OUTPATIENT
Start: 2023-01-01 | End: 2023-04-29

## 2023-01-01 RX ORDER — SODIUM PHOSPHATE, DIBASIC AND SODIUM PHOSPHATE, MONOBASIC 7; 19 G/133ML; G/133ML
1 ENEMA RECTAL ONCE AS NEEDED
Status: DISCONTINUED | OUTPATIENT
Start: 2023-01-01 | End: 2023-04-29

## 2023-01-01 RX ORDER — HYDROMORPHONE HYDROCHLORIDE 1 MG/ML
0.2 INJECTION, SOLUTION INTRAMUSCULAR; INTRAVENOUS; SUBCUTANEOUS EVERY 5 MIN PRN
Status: DISCONTINUED | OUTPATIENT
Start: 2023-01-01 | End: 2023-01-01 | Stop reason: HOSPADM

## 2023-01-01 RX ORDER — MELATONIN
1000 DAILY
Status: DISCONTINUED | OUTPATIENT
Start: 2023-01-01 | End: 2023-01-01

## 2023-01-01 RX ORDER — TRAZODONE HYDROCHLORIDE 50 MG/1
50 TABLET ORAL NIGHTLY
Status: DISCONTINUED | OUTPATIENT
Start: 2023-01-01 | End: 2023-04-29

## 2023-01-01 RX ORDER — MORPHINE SULFATE 2 MG/ML
1 INJECTION, SOLUTION INTRAMUSCULAR; INTRAVENOUS EVERY 2 HOUR PRN
Status: DISCONTINUED | OUTPATIENT
Start: 2023-01-01 | End: 2023-04-29

## 2023-01-01 RX ORDER — GABAPENTIN 300 MG/1
600 CAPSULE ORAL 3 TIMES DAILY
Status: DISCONTINUED | OUTPATIENT
Start: 2023-01-01 | End: 2023-04-29

## 2023-01-01 RX ORDER — MORPHINE SULFATE 2 MG/ML
1 INJECTION, SOLUTION INTRAMUSCULAR; INTRAVENOUS ONCE
Status: COMPLETED | OUTPATIENT
Start: 2023-01-01 | End: 2023-01-01

## 2023-01-01 RX ORDER — POLYETHYLENE GLYCOL 3350 17 G/17G
17 POWDER, FOR SOLUTION ORAL DAILY PRN
Status: DISCONTINUED | OUTPATIENT
Start: 2023-01-01 | End: 2023-04-29

## 2023-01-01 RX ORDER — NALOXONE HYDROCHLORIDE 0.4 MG/ML
80 INJECTION, SOLUTION INTRAMUSCULAR; INTRAVENOUS; SUBCUTANEOUS AS NEEDED
Status: DISCONTINUED | OUTPATIENT
Start: 2023-01-01 | End: 2023-01-01 | Stop reason: HOSPADM

## 2023-01-01 RX ORDER — SENNOSIDES 8.6 MG
17.2 TABLET ORAL NIGHTLY
Status: DISCONTINUED | OUTPATIENT
Start: 2023-01-01 | End: 2023-04-29

## 2023-01-01 RX ORDER — SODIUM CHLORIDE 9 MG/ML
INJECTION, SOLUTION INTRAVENOUS CONTINUOUS PRN
Status: DISCONTINUED | OUTPATIENT
Start: 2023-01-01 | End: 2023-01-01 | Stop reason: SURG

## 2023-01-01 RX ORDER — HYDROMORPHONE HYDROCHLORIDE 1 MG/ML
0.2 INJECTION, SOLUTION INTRAMUSCULAR; INTRAVENOUS; SUBCUTANEOUS EVERY 2 HOUR PRN
Status: DISCONTINUED | OUTPATIENT
Start: 2023-01-01 | End: 2023-04-29

## 2023-01-01 RX ORDER — FAMOTIDINE 20 MG/1
20 TABLET, FILM COATED ORAL DAILY
Status: DISCONTINUED | OUTPATIENT
Start: 2023-01-01 | End: 2023-04-29

## 2023-01-01 RX ORDER — SENNOSIDES 8.6 MG
17.2 TABLET ORAL NIGHTLY PRN
Status: DISCONTINUED | OUTPATIENT
Start: 2023-01-01 | End: 2023-01-01

## 2023-01-01 RX ORDER — ENOXAPARIN SODIUM 100 MG/ML
40 INJECTION SUBCUTANEOUS DAILY
Status: DISCONTINUED | OUTPATIENT
Start: 2023-01-01 | End: 2023-04-29

## 2023-01-01 RX ORDER — ONDANSETRON 2 MG/ML
4 INJECTION INTRAMUSCULAR; INTRAVENOUS EVERY 6 HOURS PRN
Status: DISCONTINUED | OUTPATIENT
Start: 2023-01-01 | End: 2023-01-01

## 2023-01-01 RX ORDER — PHENYLEPHRINE HCL 10 MG/ML
VIAL (ML) INJECTION AS NEEDED
Status: DISCONTINUED | OUTPATIENT
Start: 2023-01-01 | End: 2023-01-01 | Stop reason: SURG

## 2023-01-01 RX ORDER — NALOXONE HYDROCHLORIDE 4 MG/.1ML
4 SPRAY NASAL AS NEEDED
Qty: 1 KIT | Refills: 0 | Status: SHIPPED | OUTPATIENT
Start: 2023-01-01 | End: 2023-04-29

## 2023-01-01 RX ORDER — TRAMADOL HYDROCHLORIDE 50 MG/1
50 TABLET ORAL DAILY
Status: ON HOLD | COMMUNITY
End: 2023-01-01

## 2023-01-01 RX ORDER — ROPIVACAINE HYDROCHLORIDE 5 MG/ML
INJECTION, SOLUTION EPIDURAL; INFILTRATION; PERINEURAL AS NEEDED
Status: DISCONTINUED | OUTPATIENT
Start: 2023-01-01 | End: 2023-01-01 | Stop reason: SURG

## 2023-01-01 RX ORDER — DOCUSATE SODIUM 100 MG/1
100 CAPSULE, LIQUID FILLED ORAL 2 TIMES DAILY PRN
Status: DISCONTINUED | OUTPATIENT
Start: 2023-01-01 | End: 2023-01-01

## 2023-01-01 RX ORDER — SODIUM CHLORIDE 9 MG/ML
INJECTION, SOLUTION INTRAVENOUS CONTINUOUS
Status: DISCONTINUED | OUTPATIENT
Start: 2023-01-01 | End: 2023-04-29

## 2023-01-01 RX ORDER — OXYBUTYNIN CHLORIDE 10 MG/1
10 TABLET, EXTENDED RELEASE ORAL DAILY
Status: DISCONTINUED | OUTPATIENT
Start: 2023-01-01 | End: 2023-01-01

## 2023-01-01 RX ORDER — BISACODYL 10 MG
10 SUPPOSITORY, RECTAL RECTAL
Status: DISCONTINUED | OUTPATIENT
Start: 2023-01-01 | End: 2023-04-29

## 2023-01-01 RX ORDER — HYDROCODONE BITARTRATE AND ACETAMINOPHEN 5; 325 MG/1; MG/1
1 TABLET ORAL ONCE AS NEEDED
Status: DISCONTINUED | OUTPATIENT
Start: 2023-01-01 | End: 2023-01-01 | Stop reason: HOSPADM

## 2023-01-01 RX ADMIN — PHENYLEPHRINE HCL 100 MCG: 10 MG/ML VIAL (ML) INJECTION at 20:20:00

## 2023-01-01 RX ADMIN — CEFAZOLIN SODIUM/WATER 2 G: 2 G/20 ML SYRINGE (ML) INTRAVENOUS at 20:11:00

## 2023-01-01 RX ADMIN — PHENYLEPHRINE HCL 100 MCG: 10 MG/ML VIAL (ML) INJECTION at 21:01:00

## 2023-01-01 RX ADMIN — ROCURONIUM BROMIDE 40 MG: 10 INJECTION, SOLUTION INTRAVENOUS at 19:41:00

## 2023-01-01 RX ADMIN — PHENYLEPHRINE HCL 100 MCG: 10 MG/ML VIAL (ML) INJECTION at 21:44:00

## 2023-01-01 RX ADMIN — ROPIVACAINE HYDROCHLORIDE 10 ML: 5 INJECTION, SOLUTION EPIDURAL; INFILTRATION; PERINEURAL at 21:52:00

## 2023-01-01 RX ADMIN — SODIUM CHLORIDE: 9 INJECTION, SOLUTION INTRAVENOUS at 20:01:00

## 2023-01-01 RX ADMIN — EPHEDRINE SULFATE 10 MG: 50 INJECTION INTRAVENOUS at 21:44:00

## 2023-01-01 RX ADMIN — PHENYLEPHRINE HCL 100 MCG: 10 MG/ML VIAL (ML) INJECTION at 20:07:00

## 2023-01-01 RX ADMIN — DEXAMETHASONE SODIUM PHOSPHATE 4 MG: 10 INJECTION, SOLUTION INTRAMUSCULAR; INTRAVENOUS at 21:43:00

## 2023-01-01 RX ADMIN — LIDOCAINE HYDROCHLORIDE 3 ML: 10 INJECTION, SOLUTION EPIDURAL; INFILTRATION; INTRACAUDAL; PERINEURAL at 21:51:00

## 2023-01-01 RX ADMIN — PHENYLEPHRINE HCL 100 MCG: 10 MG/ML VIAL (ML) INJECTION at 20:56:00

## 2023-01-01 RX ADMIN — EPHEDRINE SULFATE 15 MG: 50 INJECTION INTRAVENOUS at 21:02:00

## 2023-01-01 RX ADMIN — ROPIVACAINE HYDROCHLORIDE 10 ML: 5 INJECTION, SOLUTION EPIDURAL; INFILTRATION; PERINEURAL at 21:54:00

## 2023-01-01 RX ADMIN — PHENYLEPHRINE HCL 100 MCG: 10 MG/ML VIAL (ML) INJECTION at 21:21:00

## 2023-01-01 RX ADMIN — MIDAZOLAM HYDROCHLORIDE 2 MG: 1 INJECTION INTRAMUSCULAR; INTRAVENOUS at 20:20:00

## 2023-01-01 RX ADMIN — ROPIVACAINE HYDROCHLORIDE 30 ML: 5 INJECTION, SOLUTION EPIDURAL; INFILTRATION; PERINEURAL at 21:43:00

## 2023-01-01 RX ADMIN — ROCURONIUM BROMIDE 10 MG: 10 INJECTION, SOLUTION INTRAVENOUS at 20:23:00

## 2023-01-01 RX ADMIN — ROPIVACAINE HYDROCHLORIDE 10 ML: 5 INJECTION, SOLUTION EPIDURAL; INFILTRATION; PERINEURAL at 21:53:00

## 2023-01-01 RX ADMIN — SODIUM CHLORIDE, SODIUM LACTATE, POTASSIUM CHLORIDE, CALCIUM CHLORIDE: 600; 310; 30; 20 INJECTION, SOLUTION INTRAVENOUS at 19:35:00

## 2023-04-26 PROBLEM — J84.9 INTERSTITIAL PULMONARY DISEASE (HCC): Status: ACTIVE | Noted: 2023-01-01

## 2023-04-26 PROBLEM — S72.002A CLOSED FRACTURE OF NECK OF LEFT FEMUR, INITIAL ENCOUNTER (HCC): Status: ACTIVE | Noted: 2023-01-01

## 2023-04-26 NOTE — ED INITIAL ASSESSMENT (HPI)
PT transferring to chair and fell onto L hip. C/o L hip pain. Denies hitting head or LOC. PT is on blood thinners.

## 2023-04-27 NOTE — ED QUICK NOTES
Received call from Xray that \"Pt is acting weird\". This RN went to xray to find PT staring off, shallow breathing. Upon sternal rubbing PT, PT made eye contact with this RN. PT then A&Ox3, moving all extremities. ERMD notified, no further orders at this time.

## 2023-04-27 NOTE — PLAN OF CARE
Pt is alert and oriented x4, VSS, tele, room air, NPO, IV saline locked, external catheter in place, pt c/o decreased numbness to BLE, PO meds for pain see MAR, plan for pre-op ECHO and IM nailing of left femur today at 1600, all safety measures in place, call light within reach, pt and spouse updated with POC. Will continue to monitor.

## 2023-04-27 NOTE — PLAN OF CARE
Patient A&O X4 on RA. VSS, /IS. SCDs encouraged. Voiding freely via external catheter, LBM 4/26. Admitted with left femur fracture from [facility]. Femoral block in ED. Decreased sensation to left leg. Wheelchair bound at baseline. Ortho consulted in ED. Reminded to use call light. Wife at bedside and updated on POC.

## 2023-04-27 NOTE — ED QUICK NOTES
Orders for admission, patient is aware of plan and ready to go upstairs. Any questions, please call ED RN Shayy at extension [phone]. Patient Covid vaccination status: Fully vaccinated     COVID Test Ordered in ED: None    COVID Suspicion at Admission: N/A    Running Infusions:  None    Mental Status/LOC at time of transport: A&Ox3    Other pertinent information: L hip fracture, awaiting anesthesiologist for femoral nerve block.  Anticipating surgery tomorrow with Dr. Marleny Nair score: N/A   NIH score:  N/A

## 2023-04-27 NOTE — PROGRESS NOTES
Left hip Intertrochanteric Hip Fx    Admit to hospitalist  Clearance for OR tomorrow  Please see tonight for medical optimization  Hold plavix  To OR tomorrow with Eusebio Barragan for IMN    NPO MN

## 2023-04-27 NOTE — ANESTHESIA PROCEDURE NOTES
Regional Block    Date/Time: 4/26/2023 9:45 PM    Performed by: Sylvain Mcmanus MD  Authorized by: Sylvain Mcmanus MD      General Information and Staff    Start Time:  4/26/2023 9:45 PM  End Time:  4/26/2023 9:54 PM  Anesthesiologist:  Sylvain Mcmanus MD  Performed by: Anesthesiologist  Patient Location:  ED    Block Placement: Pre Induction  Site Identification: real time ultrasound guided and image stored and retrievable    Block site/laterality marked before start: site marked  Reason for Block: procedure for pain and at request of ED Physician    Preanesthetic Checklist: 2 patient identifers, IV checked, risks and benefits discussed, monitors and equipment checked, pre-op evaluation, timeout performed, anesthesia consent, sterile technique used, no prohibitive neurological deficits and no local skin infection at insertion site      Procedure Details    Patient Position:  Supine  Prep: ChloraPrep    Monitoring:  Cardiac monitor, continuous pulse ox and blood pressure cuff  Laterality:  Left  Injection Technique:  Single-shot    Needle    Needle Type:  Short-bevel and echogenic  Needle Gauge:  21 G  Needle Length:  100 mm  Needle Localization:  Ultrasound guidance  Reason for Ultrasound Use: appropriate spread of the medication was noted in real time and no ultrasound evidence of intravascular and/or intraneural injection    Nerve Stimulator: 1 amps    Muscle Twitch Response Comment: No twitch to nerve stimulator, but good position by ultrasound. Assessment    Injection Assessment:  Good spread noted, negative resistance, negative aspiration for heme, incremental injection, low pressure, local visualized surrounding nerve on ultrasound and no pain on injection  Heart Rate Change: No    - Patient tolerated block procedure well without evidence of immediate block related complications. Medications  4/26/2023 9:45 PM      Additional Comments    Patient seen in ER, s/p hip fracture. Consent obtained for block.   Local lidocaine 1%. Ropivacaine 0.5% 30ccs total.       Image in [place] through [facility] [facility] [employer], and also hard copy printed. Patient feels full relief after injection. As noted, no twitch obtained, but ultrasound image appropriate.

## 2023-04-27 NOTE — PROGRESS NOTES
Dr. Amarilis Ga, cardiologist, reviewed patient's 2D Echo. Cardiologist Dr. Amarilis Ga informed RN that patient is clear for surgery.

## 2023-04-27 NOTE — ED QUICK NOTES
Wife of PT has come to Nurses' station multiple times to complain about the wait time for transport.  This RN has apologized numerous times and reinforced that the transport team is aware and has not forgotten about him

## 2023-04-27 NOTE — PHYSICAL THERAPY NOTE
PT order received via mobility training, chart reviewed. Pt admitted with fracture, plan for surgical intervention today. Please reconsult AFTER surgical intervention with any weight bearing or activity restrictions.

## 2023-04-27 NOTE — PROGRESS NOTES
NURSING ADMISSION NOTE      Patient admitted via cart. Oriented to room. Safety precautions initiated. Bed in low position. Call light in reach. Admission navigator completed at bedside. PTA med rec completed from paperwork.

## 2023-04-27 NOTE — CM/SW NOTE
04/27/23 1500   CM/SW Referral Data   Referral Source Social Work (self-referral)   Reason for Referral Discharge planning   Informant Spouse/Significant Other   Patient Info   Patient's Home Environment Independent Living   Patient lives with Spouse/Significant other   Patient Status Prior to Admission   Independent with ADLs and Mobility No   Pt. requires assistance with Housework;Driving;Meals; Bathing; Ambulating;Finances; Toileting;Dressing;Medications   Services in place prior to admission DME/Supplies at home;[facility]   Type of DME/Supplies Wheeled Walker;Electric Wheelchair; Wheelchair   [facility] Home Care Provider Private Duty   [facility] Care hours per day varies   [facility] Care days per week 7   Discharge Needs   Anticipated D/C needs Subacute rehab     Sw met with pt and wife to assess for eventual dc needs. Pt is 77 yo male, admitted after fall at home. Pt has L hip fx and will be having L hip pinning today at 6pm.    Pt and wife live at [facility]. Pt is mainly wheelchair bound at baseline. Pt can do transfers with assist of one ( either wife or their caregiver). Wife states they have private caregiver every am and pm to assist with pt. Wife anticipates that pt will need RINKU at [facility]. He has been there before. He did not have [employer] PPO at the time. Sent early referral to [facility] to make sure they are in-network. Will need auth for RINKU stay. Sw to update wife on in-network status of [facility] once known. Will also need to add op report and PT notes once completed to RINKU referral. Will also need PASSR done.     Aakash Bolden LCSW  /Discharge Planner

## 2023-04-28 NOTE — CM/SW NOTE
PT /OT rec KANE Gilbert is in-network with pt's [employer]. Will add PASSR to [facility] referral.  Await therapy notes in [facility] to start insurance.   [facility]  started [place]- ID [id number]    Jose Luis Higgins LCSW  /Discharge Planner

## 2023-04-28 NOTE — ANESTHESIA PROCEDURE NOTES
Regional Block    Date/Time: 4/27/2023 9:41 PM    Performed by: Lukas Argueta MD  Authorized by: Lukas Argueta MD      General Information and Staff    Start Time:  4/27/2023 9:41 PM  End Time:  4/27/2023 9:43 PM  Anesthesiologist:  Lukas Argueta MD  Performed by: Anesthesiologist  Patient Location:  OR      Site Identification: real time ultrasound guided and image stored and retrievable    Block site/laterality marked before start: site marked  Reason for Block: at surgeon's request and post-op pain management    Preanesthetic Checklist: 2 patient identifers, IV checked, risks and benefits discussed, monitors and equipment checked, pre-op evaluation, timeout performed, anesthesia consent, sterile technique used, no prohibitive neurological deficits and no local skin infection at insertion site      Procedure Details    Patient Position:  Supine  Prep: ChloraPrep    Monitoring:  Cardiac monitor, continuous pulse ox and blood pressure cuff  Block Type:  Femoral  Laterality:  Left  Injection Technique:  Single-shot    Needle    Needle Type:  Short-bevel and echogenic  Needle Gauge:  21 G  Needle Length:  50 mm  Needle Localization:  Ultrasound guidance  Reason for Ultrasound Use: appropriate spread of the medication was noted in real time and no ultrasound evidence of intravascular and/or intraneural injection            Assessment    Injection Assessment:  Good spread noted, negative resistance, negative aspiration for heme, incremental injection and low pressure  Paresthesia Pain:  None  Heart Rate Change: No    - Patient tolerated block procedure well without evidence of immediate block related complications. Medications  4/27/2023 9:41 PM      Additional Comments    Medication:  Ropivacaine 0.5% 30mL  with 1:200,000 epi and dexamethasone 4mg PF.

## 2023-04-28 NOTE — ANESTHESIA PROCEDURE NOTES
Airway  Date/Time: 4/27/2023 7:44 PM  Urgency: elective    Airway not difficult    General Information and Staff    Patient location during procedure: OR  Anesthesiologist: Sheyla You MD  Performed: anesthesiologist   Performed by: Sheyla You MD  Authorized by: Sheyla You MD      Indications and Patient Condition  Indications for airway management: anesthesia  Spontaneous Ventilation: absent  Sedation level: deep  Preoxygenated: yes  Patient position: sniffing  Mask difficulty assessment: 2 - vent by mask + OA or adjuvant +/- NMBA    Final Airway Details  Final airway type: endotracheal airway      Successful airway: ETT  Cuffed: yes   Successful intubation technique: Video laryngoscopy  Facilitating devices/methods: intubating stylet  Endotracheal tube insertion site: oral  Blade: GlideScope  Blade size: #3  ETT size (mm): 7.5    Cormack-Lehane Classification: grade IIA - partial view of glottis  Placement verified by: chest auscultation and capnometry   Cuff volume (mL): 10  Measured from: lips  ETT to lips (cm): 23  Number of attempts at approach: 1  Ventilation between attempts: none  Number of other approaches attempted: 0    Additional Comments  PreO2. IV induction as noted. Eyes taped. Easy mask ventilation. Glidescope #3 used, vocal cords visualized, atraumatic oral intubation ETT 7.5, +ETCO2, +BBS. Taped and secured at 23 cm.

## 2023-04-28 NOTE — PLAN OF CARE
2344: Page sent to Dr. Karlie West regarding patient disorientated post-op but can be redirected. MD made aware of SBP maintaing low since PACU, order received for 500mL bolus of 0.9 then continuous rate of 83mL and stop IVF when bag finished. Home meds to be resumed by Dr. Karlie West and taken off [facility] hold.

## 2023-04-28 NOTE — OPERATIVE REPORT
[facility]  Report of [address] Patient Status:  Inpatient    1940 MRN [id number]   [facility] SURGERY Attending Blaire Altamirano MD   Hosp Day # 1 PCP Hoda Staley MD     PRE-OP DX:  LEFT INTERTROCHANTERIC HIP FRACTURE  POST-OP DX:  SAME  PROCEDURE:LEFT FEMUR INTRAMEDULLARY FIXATION  SURGEON:  Rick Duggan MD  FIRST ASSIST: NONE  ANESTHESIA:  GENERAL  ABX:442LL  COMPLICATIONS:  NONE  SPECIMEN:  NONE  DRAIN: NONE   IMPLANT USED: Casey natural intramedullary fransico 125 deg x  11.5 mm x 400 mm,  hip screw 120 mm, locking screw 55 mm  PROCEDURE NOTE:  PATIENT WAS CORRECTLY IDENTIFIED AND OPERATIVE SITE WAS VERIFIED IN THE PREOPERATIVE HOLDING AREA. PATIENT WAS BROUGHT TO THE OR AND WAS LAID IN SUPINE POSITION. ARMS WERE POSITIONED BY ANESTHESIA. PREOPERATIVE ANTIBIOTIC WAS GIVEN. NONOPERATIVE LEG HAD SCD APPLIED. ANESTHESIA WAS ADMINISTERED. PATIENT WAS MOVED ON TO THE FRACTURE TABLE. NONOPERATIVE LEG WAS PADDED AND PLACED IN A LEG MIRZA. IT WAS FLEXED AND ABDUCTED OUT OF THE WAY. RIGHT HIP WAS VERY STIFF. THE OPERATIVE FOOT WAS PADDED AND PLACED IN A FOOT MIRZA. GENTLE TRACTION AND INTERNAL ROTATION WAS PLACED. C-ARM WAS USED TO ASSESS FRACTURE REDUCTION BY CHECKING AP AND LATERAL VIEWS. I USED A CRUTCH TO SUPPORT THE SAGGING OF THE FRACTURE SITE. OPERATIVE HIP  WAS PREPPED AND DRAPED IN A SURGICALLY STERILE AND STANDARD FASHION. TIME OUT WAS PERFORMED. A 2 INCH  HIP INCISION WAS MADE SUPERIOR TO THE TIP OF THE GREATER TROCHANTER. SHARP DISSECTION WAS MADE THROUGH THE SUBCUTANEOUS TISSUE FAT. FASCIAL LAYER WAS SHARPLY DIVIDED. A CANNULATED AWL WAS USED TO FIND THE TIP OF G-TROCH AND INSERTED. A LONG GUIDE WIRE WAS INSERTED. POSITION WAS VERIFIED ON C ARM. CANNULATED REAMER WAS USED IN STANDARD FASHION. FEMORAL LENGTH WAS MEASURED. TROCHANTERIC FEMORAL FRANSICO WAS INSERTED IN STANDARD FASHION. A SECOND INCISION WAS MADE TO INSERT THE HIP SCREW.   A GUIDE WIRE WAS INSERTED UNDER C-ARM GUIDANCE TO CENTER/CENTER POSITION. LENGTH WAS MEASURED. A CANNULATED REAMER WAS USED. THE HIP SCREW WAS INSERTED. LATERAL VIEW CHECK SHOWED IT WAS MUCH ANTERIORLY POSITIONED THAN THE PRIOR C ARM VIEW. SCREW WAS THEN TAKEN BACK OUT. GUIDE WIRE WAS RE-POSITIONED. REAMING WAS REDONE AND HIP SCREW WAS RE-INSERTED. SCREW GAVE GOOD FIXATION. SET SCREW WAS INSERTED IN SLIDING MODE. C ARM WAS USED AGAIN TO LOOK AT THE AP AND LATERAL VIEW. SCREW POSITION  APPEARED SATISFACTORY. DISTAL LOCKING SCREW WAS INSERTED. C-ARM WAS USED TO VISUALIZE THE ENTIRE FEMUR. FASCIAL LAYER WAS CLOSED. SUBCUTANEOUS LAYER WAS CLOSED. SKIN WAS CLOSED WITH STAPLES. DRESSING WAS APPLIED. ALL COUNTS WERE CORRECT.     Gamaliel Gonzalez MD

## 2023-04-28 NOTE — PLAN OF CARE
POD 1 left femoral rodding, pt alert and oriented x4, room air, tolerating regular diet, voiding via external catheter, PT to see this AM, aquacel x3 dry and intact, pt does not complain of any pain, 50% WB to left lower extremity. Started on lovenox. All safety measures in place, spouse at bedside. Encouraged pt to use incentive spirometer with teach back education. Will continue to monitor. 1004: Pt has productive cough this AM - paged Doe Muhammad DO regarding an order for Mucinex. Order obtained - see MAR.

## 2023-04-28 NOTE — CM/SW NOTE
Received a call from [phone] Supervisor/Staci vera some concern that pt's wife being confused? And is at the OR holding area since pt is in surgery for fx femur. EDCM checked pt's facesheet and no other emergency contact listed. Tried to call pt's home and its just a voicemail. Per Nursing Supervisor, pt and wife has caregiver accdg to the nurse taking care of the pt. [facility] supervisor was made hickey that if pt has keys to their house/unit at [facility], this Matias Lovell can arrange a ride for the pt's wife so she can be home. Will wait for updates. [facility] called [facility] [phone] and was able to get the pt's wife's daughters info- Poe Randi  Cell# [phone] and home# [phone]. Per Chinyere/daughter, her mom is not confused, and is totally oriented x4. Pt's wife planned to stay tonight w/ the pt in the hospital. And only the pt has caregiver per pt's wife daughter. [facility] supervisor was updated w/ the above. Pt's daughter made aware if her mom would like to go home, this writer can arrange a ride for her. Pt's daughter unable to pick mom up if mom would like to go home since she does not have a car right now. Pt's daughter just spoke w/ the pt's wife(her mother) and that surgery went well and expressed she would like to stay tonight and that in case pt's wife would like to go home, pt's wife does have keys to their house.

## 2023-04-28 NOTE — ANESTHESIA POSTPROCEDURE EVALUATION
Stephanie Rolle Patient Status:  Inpatient   Age/Gender 78year old male MRN [id number]   Location [address] [facility] Attending Ramez Soler MD   [facility] Day # 1 PCP Maylin Domínguez MD       Anesthesia Post-op Note    LEFT FEMORAL RODDING    Procedure Summary     Date: 04/27/23 Room / Location: [facility] MAIN OR 08 / [facility] OR    Anesthesia Start: 1934 Anesthesia Stop: 2156    Procedure: LEFT FEMORAL RODDING (Left: Leg Upper) Diagnosis:       Intertrochanteric fracture ([address]      (Intertrochanteric fracture ([address] [S85.622T])    Surgeons: Amari Recio MD Anesthesiologist: Alfonso Gutierrez MD    Anesthesia Type: general ASA Status: 3 - Emergent          Anesthesia Type: general    Vitals Value Taken Time   BP 90/53 04/27/23 2156   Temp 96.9 04/27/23 2201   Pulse 67 04/27/23 2200   Resp 24 04/27/23 2200   SpO2 93 % 04/27/23 2200   Vitals shown include unvalidated device data. Patient Location: PACU    Anesthesia Type: general    Airway Patency: patent and extubated    Postop Pain Control: adequate    Mental Status: mildly sedated but able to meaningfully participate in the post-anesthesia evaluation    Nausea/Vomiting: none    Cardiopulmonary/Hydration status: stable euvolemic    Complications: no apparent anesthesia related complications    Postop vital signs: stable    Dental Exam: Unchanged from Preop    Patient to be discharged from PACU when criteria met.

## 2023-04-28 NOTE — CM/SW NOTE
Edy Sherman gave [facility] for RINKU for [facility] # [id number] [id number]. Await clearance for nancy Arguello LCSW  /Discharge Planner

## 2023-04-28 NOTE — ANESTHESIA PROCEDURE NOTES
Arterial Line    Date/Time: 4/27/2023 7:53 PM    Performed by: Jada Blank MD  Authorized by: Jada Blank MD    General Information and Staff    Procedure Start:  4/27/2023 7:53 PM  Procedure End:  4/27/2023 7:56 PM  Anesthesiologist:  Jada Blank MD  Performed By:  Anesthesiologist  Patient Location:  OR  Indication: continuous blood pressure monitoring and blood sampling needed    Site Identification: real time ultrasound guided, surface landmarks and image stored and retrievable    Preanesthetic Checklist: 2 patient identifiers, IV checked, risks and benefits discussed, monitors and equipment checked, pre-op evaluation, timeout performed, anesthesia consent and sterile technique used    Procedure Details    Catheter Size:  20 G  Catheter Length:  1 and 3/4 inch  Catheter Type:  Arrow  Seldinger Technique?: Yes    Laterality:  Right  Site:  Radial artery  Site Prep: chlorhexidine    Line Secured:  Wrist Brace, tape and Tegaderm    Assessment    Events: patient tolerated procedure well with no complications      Medications  4/27/2023 7:53 PM      Additional Comments    Right radial arterial catheter placed under ultrasound guidance; +good waveform and correlation with NIBP. Taped and secured. Following invasive pressures.

## 2023-04-28 NOTE — ANESTHESIA PROCEDURE NOTES
Peripheral IV  Date/Time: 4/27/2023 8:01 PM  Inserted by: Myrtis Sacks., MD    Placement  Needle size: 18 G  Laterality: left  Location: hand  Local anesthetic: none  Site prep: alcohol  Technique: anatomical landmarks  Attempts: 1

## 2023-04-28 NOTE — PROGRESS NOTES
[facility] Pharmacy Note:  Renal Dose Adjustment    Grayson Joyner has been prescribed famotidine (PEPCID) 20 mg orally every 12 hours. Estimated Creatinine Clearance: 44.3 mL/min (based on SCr of 1.16 mg/dL). Calculated creatinine clearance is < 50 ml/min, therefore the dose of famotidine (Pepcid) has been changed to 20 mg orally every 24 hours per P&T approved protocol. Pharmacy will continue to follow, and if renal function improves, will resume the original order.     Thank you,  Cathy Jean, [facility]  4/28/2023 8:24 AM

## 2023-04-29 LAB
ATRIAL RATE: 129 BPM
Q-T INTERVAL: 308 MS
QRS DURATION: 154 MS
QTC CALCULATION (BEZET): 442 MS
R AXIS: 2 DEGREES
T AXIS: 157 DEGREES
VENTRICULAR RATE: 124 BPM

## 2023-04-29 NOTE — PROGRESS NOTES
04/28/23 2208   Clinical Encounter Type   Visited With Patient and family together  (wife at bedside)   Routine Visit Introduction  (Rapid Response)   Continue Visiting No   Crisis Visit Patient actively dying;Death   Patient's Supportive Strategies/Resources  provided much needed support for the wife who was finding the situation extremely difficult   Family Spiritual Encounters   Family Coping Anxiety; Fearful; Sadness   Family Participation in Care Consistently   Family Support During Treatment Consistently   Taxonomy   Intended Effects Convey a calming presence   Methods Offer spiritual/Zoroastrianism support; Offer emotional support   Interventions Acknowledge response to difficult experience; Acknowledge current situation; Active listening;Provide compassionate touch;[place];Respond as  to a defined crisis event; Facilitate understanding of limitations

## 2023-04-29 NOTE — PROGRESS NOTES
Patient complaint of having hard time falling asleep and asking to take his sleeping pills. Noted patient is restless, denies having any pain. Keep on saying \" give me my sleeping pill\". Melatonin 10 mg  Tablet given as scheduled. Patient on room air, denied having shortness of breath, oxygen saturation at 94%. Heart rate noted to be elevated at 130 bpm.  Patient on telemetry and  monitoring.

## 2023-04-29 NOTE — PROGRESS NOTES
RRT    *See RRT Documentation Record*    Reason the RRT was called: patient with elevated heart rate, EKG shows, new onset Afib with RVR  Assessment of patient leading up to RRT: Alert and oriented x 3, able to verbalized needs, heart rate at 130 bpm, patient verbalizing he can't sleep. Interventions/Testing: IV fluids increase, assessed patient neuro to r/o stroke. Cardiology  and [facility] hospitalist paged.   Patient Outcome/Disposition: Transfer patient to ICU  Family Notified: Yes, wife at the bedside  Name of family notified: Marylene Glasser

## 2023-04-29 NOTE — PROGRESS NOTES
Wife came out of the room, told RN that patient asking for help. Heart rate noted to be between 120-130 bpm.  Complaint of having shortness of breath. Placed patient on oxygen at 2L/nc. EKG done, shows Afib with RVR. RRT called.

## 2023-04-29 NOTE — PROGRESS NOTES
RN placed calls to [facility] [place] regarding patient expiration. [facility]: Patient not a candidate for donation  : Okay to release body to selected  home. Aman Zaman spoke hospitalist Dr. Nazia Torres + Dr. Simon Coffman regarding patient expiration. Wife Nathan Lipscomb signed release body paper to sent to Kristine Mclaughlin in [place] when ready.

## 2023-05-01 NOTE — PAYOR COMM NOTE
--------------  DISCHARGE REVIEW---      Payor: [facility] MEDICARE ADV PPO  Subscriber #:  [id number]  Authorization Number: [id number]    Admit date: 23  Admit time:  11:07 PM  Discharge Date: 2023 11:47 PM     Admitting Physician: Criss Casper MD  Attending Physician:  No att. providers found  Primary Care Physician: Gilbert Ventura MD

## 2023-09-13 NOTE — PLAN OF CARE
Assumed care at [address] and orietned x3-4, drowsy & forgetful  Neuro Q 4, L side flaccid, L droop, L field cut  Slurred speech noted  Seizure precautions maintained  SBP goal 120-180  TF infusing per Dobhoff  C/o pain to R leg, relief with norco  IVF a Noted this medication has been discontinued and an alternative has been sent to pharmacy.  PA no longer in process. Closing this encounter.    Progressing  Goal: Remains free of injury related to seizure activity  Description  INTERVENTIONS:  - Maintain airway, patient safety  and administer oxygen as ordered  - Monitor patient for seizure activity, document and report duration and description of subluxation  - Encourage patient to incorporate impaired side during daily activities to promote function   Outcome: Progressing

## (undated) DEVICE — SOL NACL IRRIG 0.9% 1000ML BTL

## (undated) DEVICE — SUT VICRYL 1 OS-6 J535H

## (undated) DEVICE — SUT VICRYL 2-0 CP-1 J266H

## (undated) DEVICE — 3M™ IOBAN™ 2 ANTIMICROBIAL INCISE DRAPE 6650EZ: Brand: IOBAN™ 2

## (undated) DEVICE — Device

## (undated) DEVICE — DRESS WOUND AQUACEL 3.5INX6INL

## (undated) DEVICE — POSITIONER OR KT PT CR

## (undated) DEVICE — DRILL SRG 152.5MM FR  NTR

## (undated) DEVICE — STERILE POLYISOPRENE POWDER-FREE SURGICAL GLOVES: Brand: PROTEXIS

## (undated) DEVICE — PIN XTRNFX 508MM 3.2MM NTR NL

## (undated) DEVICE — STERILE POLYISOPRENE POWDER-FREE SURGICAL GLOVES WITH EMOLLIENT COATING: Brand: PROTEXIS

## (undated) DEVICE — LIGHT HANDLE

## (undated) DEVICE — GAUZE TRAY STERILE 4X4 12PLY

## (undated) DEVICE — ATTAHJA VAC WITH 22MM CONNECTR

## (undated) DEVICE — SYRINGE BULB 50/CS 48/PLT: Brand: MEDEGEN MEDICAL PRODUCTS, LLC

## (undated) DEVICE — HIP PINNING CDS: Brand: MEDLINE INDUSTRIES, INC.

## (undated) DEVICE — DRESS WOUND AQUACEL 3.5INX10IN

## (undated) DEVICE — SLEEVE KENDALL SCD EXPRESS MED

## (undated) NOTE — IP AVS SNAPSHOT
Patient Demographics     Address  [address]   [place] [address] Phone  [phone] [facility]) *Preferred*  [phone] [facility]) E-mail Address  [email or web address]      Emergency Contact(s)     Name Relation Home Work [address] [facility] dose due: 1/22 Morning      Take 1 tablet (75 mg total) by mouth daily. Randy Davis MD         docusate sodium 100 MG Caps  Commonly known as: COLACE  Next dose due: Take as needed      Take 100 mg by mouth daily as needed.    MD Darrion Blank Take 1 tablet (50 mg total) by mouth every evening. Yang Lagos MD         Trospium Chloride ER 60 MG Cp24  Commonly known as: SANCTURA XR  Next dose due: 1/22 Morning      Take 1 capsule (60 mg total) by mouth daily.    Hair Dawn DO         Vit 01/21/2022 1137   Resp 20 Filed at 01/21/2022 1137   Temp 97.6 °F (36.4 °C) Filed at 01/21/2022 1137   SpO2 95 % Filed at 01/21/2022 1137      Patient's Most Recent Weight       Most Recent Value   Patient Weight 92 kg (202 lb 12.8 oz)         Lab Results HOSPITAL LAB ([facility])    Specimen Information    Type Source Collected On   Blood — 01/21/22 0733          Components    Component Value Reference Range Flag Lab   Glucose 77 70 - 99 mg/dL — [facility])   Sodium 136 136 - 145 mmol/L — ug/mL FEU — [facility])   Comment:        FEU = Fibrinogen Equivalent Units.      D-Dimer results within the reference range have been shown to contribute to the exclusion of venous thromboembolism with a negative predictive value of approximately 95% Sensitive     Piperacillin + Tazobactam <=4  Sensitive     Trimethoprim/Sulfa <=20  Sensitive                   Linear View                   Blood Culture [[id number]] Collected: 01/19/22 1322    Order Status: Completed Lab Status: Preliminary result Upda Patient presents with:  Nausea/Vomiting/Diarrhea  Syncope       PCP: Mayela Saldana MD      History of Present Illness: Patient is a 78year old male with PMH sig for HX STROKE, hl, htn, gerd, DEPRESSION, and carotid artery stenosis who presents to ER af TRANSFORAMINAL EPIDURAL; LUMBAR/SACRAL, ADD'L LEVEL Right 3/9/2016    Procedure: TRANSFORAMINAL EPIDURAL - LUMBAR;  Surgeon: Meryl Greene MD;  Location: [address] [place] [address]   • INJECTION, ANESTHETIC/STEROID, TRANSFORAMINAL EPIDURAL; LUMBAR/SA HAVE EXPERIENCED ANY OF THE FOLLOWING EVENTS  10/4/2013    Procedure: LUMBAR EPIDURAL;  Surgeon: Venita Vieira MD;  Location: [address] [place] [address]   • PATIENT DOCUMENTED NOT TO HAVE EXPERIENCED ANY OF THE FOLLOWING EVENTS  10/21/2013    Procedu PATIENT [place] PREOPERATIVE ORDER FOR IV ANTIBIOTIC SURGICAL SITE INFECTION PROPHYLAXIS.  N/A 11/10/2014    Procedure: LUMBAR EPIDURAL;  Surgeon: Bhargavi Yoon MD;  Location: [facility] FOR PAIN MANAGEMENT   • PATIENT [address] FOR IV tablet (325 mg total) by mouth daily. B Complex Vitamins (VITAMIN B COMPLEX) Oral Tab, Take 1 tablet by mouth daily. Vitamin D3 (VITAMIN D3) 1000 UNITS Oral Tab, Take 1,000 Units by mouth daily.         Social History    Tobacco Use      Smoking status: F no murmur, rub or gallop appreciated   Abdomen:   Soft, non-tender. Bowel sounds normal. No masses,  No organomegaly. Non distended   Extremities: Extremities normal, atraumatic, no cyanosis or edema.    Skin: Skin color, texture, turgor normal. No rashes o hospitalist to continue to follow patient while in house  A total of 70 minutes taken with patient and coordinating care. Greater than 50% face to face encounter.                 Electronically signed by Brayden Zepeda DO on 1/20/2022  2:04 PM     H&P s artery stenosis 7/16/2018   • Depression    • Esophageal reflux    • High blood pressure    • High cholesterol    • Hyperlipidemia    • Stroke [facility]) 2-6-11   • Unspecified essential hypertension       Past Surgical History:   Procedure Laterality Date   • INJECTION, W/WO CONTRAST, DX/THERAPEUTIC SUBSTANCE, EPIDURAL/SUBARACHNOID; LUMBAR/SACRAL  10/21/2013    Procedure: LUMBAR EPIDURAL;  Surgeon: Stacey Flores MD;  Location: [address] [place] [address]   • INJECTION, W/WO CONTRAST, DX/THERAPEUTIC SUBSTAN 9/15/2014    Procedure: ;  Surgeon: Noel Lester MD;  Location: [address] [place] [address]   • PATIENT DOCUMENTED NOT TO HAVE EXPERIENCED ANY OF THE FOLLOWING EVENTS N/A 11/10/2014    Procedure: LUMBAR EPIDURAL;  Surgeon: Noel Lester MD;  Mckenna Corbin sprays by Nasal route 2 (two) times daily. tamsulosin (FLOMAX) cap, TAKE ONE CAPSULE BY MOUTH ONE TIME DAILY  traZODone 50 MG Oral Tab, Take 1 tablet (50 mg total) by mouth every evening.   simvastatin 20 MG Oral Tab, Take 1 tablet (20 mg total) by mouth d ca   • Heart Disorder Sister         stroke        Review of Systems    CONSTITUTIONAL:  As per HPI (-) weight loss (-) fevers  HEENT:  Eyes:  (-) diplopia (-) blurred vision   ENT:  (-) earache (-) sore throat (-) runny nose  CARDIOVASCULAR:  (-) chest pa CO2 22.0 01/19/2022     01/19/2022    CA 8.7 01/19/2022    ALB 3.0 01/19/2022    ALKPHO 67 01/19/2022    BILT 0.4 01/19/2022    TP 6.3 01/19/2022    AST 24 01/19/2022    ALT 21 01/19/2022       CXR:  image personally reviewed.       Radiology: XR Order for: 1. Postitive COVID patients or 2.  Pending COVID, febrile patients [[id number]] ordered by Hazel Saini DO at 01/19/22 3716                                                                 INFECTIOUS DISEASE CONSULTATION    [place] Location: [facility] FOR PAIN MANAGEMENT   • MANNY FRAZIER & Alisa Mendoza NDL/CATH SPI DX/THER NJX N/A 11/10/2014    Procedure: LUMBAR EPIDURAL;  Surgeon: Jonny Mason MD;  Location: [address] [place] [address]   • HERNIA SURGERY     • INJECTION, ANESTHETIC/ST of intertrochanteric fx by Dr. Catherine Zaragoza   • OTHER SURGICAL HISTORY  2010    ORIF left prox humerus   • OTHER SURGICAL HISTORY N/A 4/27/2015    Procedure: LUMBAR LAMINECTOMY 3 LEVEL;  Surgeon: Leydi Yin MD;  Location: [facility] MAIN OR   • PATIENT DOCUMENTED NO Jez Teixeira MD;  Location: [facility] FOR PAIN MANAG[facility]   • PATIENT [address] FOR IV ANTIBIOTIC SURGICAL SITE INFECTION PROPHYLAXIS.   9/15/2014    Procedure: ;  Surgeon: Jez Teixeira MD;  Location: [address] [place] [address] mg, 10 mg, Oral, Nightly  •  tamsulosin (FLOMAX) cap 0.4 mg, 0.4 mg, Oral, Daily  •  traZODone (DESYREL) tab 50 mg, 50 mg, Oral, Nightly  No current facility-administered medications on file prior to encounter.   Calcium Carbonate-Vitamin D 250-125 MG-UNIT both eyes every evening., Disp: , Rfl:   aspirin 325 MG Oral Tab, Take 1 tablet (325 mg total) by mouth daily. , Disp: 30 tablet, Rfl: 0  B Complex Vitamins (VITAMIN B COMPLEX) Oral Tab, Take 1 tablet by mouth daily. , Disp: , Rfl:   Vitamin D3 (VITAMIN D3) Value Ref Range    Urine Culture >100,000 CFU/ML Escherichia coli (A) N/A           Established Problem list:  Patient Active Problem List:     BENIGN HYPERTENSION     Esophageal reflux     Benign non-nodular prostatic hyperplasia with lower urinary tract Physical Therapist    Filed: 1/20/2022 11:34 AM Date of Service: 1/20/2022 10:43 AM Status: Addendum    : Jaspal Acevedo PT (Physical Therapist)    Related Notes: Original Note by Jaspal Acevedo PT (Physical Therapist) filed at 1/20/2022 10:54 AM full time caregiver. Based on this evaluation, patient's clinical presentation is stable and overall the evaluation complexity is considered low.     DISCHARGE RECOMMENDATIONS  PT Discharge Recommendations: Sub-acute rehabilitation (pt is hoping to return time  Management Techniques:  Activity promotion    COGNITION  · Overall Cognitive Status:  WFL - within functional limits  · Awareness of Deficits:  decreased awareness of deficits    RANGE OF MOTION AND STRENGTH ASSESSMENT  Upper extremity ROM and strengt Assessment  Gait Assistance:  Moderate assistance  Distance (ft): 2  Assistive Device: Rolling walker  Pattern: Shuffle;L Decreased stance time    Skilled Therapy Provided     Bed Mobility:  Supine to sit: mod a to initiate, max a to complete with assist fo Evaluation: Initial  Physician Order: PT Eval and Treat    Presenting Problem: COVID 19  Co-Morbidities : htn, right cva with left hemiparesis, mult epidurals  Reason for Therapy: Mobility Dysfunction and Discharge Planning    History related to current ad motion;Strengthening;Transfer training  Rehab Potential : Good  Frequency (Obs): 5x/week  Number of Visits to Meet Established Goals: 5      CURRENT GOALS    Goal #1 Patient is able to demonstrate supine - sit EOB @ level: minimum assistance     Goal #2 Pa grossly 3-/5    Lower extremity ROM is within functional limits     Lower extremity strength is within functional limits except for the following:    Right Hip flexion  3+/5  Left Hip flexion  3-/5  Right Knee extension  3+/5  Left Knee extension  3-/5  Ri attempt mod a of two   Stand to sit: as above, pt with dec ability to control descent, requires assist  Gait = mod a of two to amb approx 2' with rw bed to chair. Pt reports he is wc bound, and assist of one to perform standing transfers.   Chair alarm act B-12 Up To 1000mcg, IM/SC Inj 04/09/19     Vitamin B-12 Up To 1000mcg, IM/SC Inj 03/12/19     Vitamin B-12 Up To 1000mcg, IM/SC Inj 12/11/18     Vitamin B-12 Up To 1000mcg, IM/SC Inj 11/02/18     Vitamin B-12 Up To 1000mcg, IM/SC Inj 09/21/18     Vitamin B

## (undated) NOTE — IP AVS SNAPSHOT
Patient Demographics     Address  [address]   [place] [address] Phone  [phone] (Home) *Preferred*  [phone] [facility]) E-mail Address  [email or web address]      Emergency Contact(s)     Name Relation Home Work Mobile    Aquilino Darren Factor, DO         famoTIDine 20 MG Tabs  Commonly known as:  PEPCID  Next dose due:  2/13      Take 1 tablet (20 mg total) by mouth daily.    Darren Factor, DO         folic acid 1 MG Tabs  Commonly known as:  Gracy Pinks  Next dose due:  2/13 Next dose due:  2/13      Take 1 capsule (60 mg total) by mouth daily. Genny Castillo, DO         Vitamin B Complex Tabs  Next dose due:  2/13      Take 1 tablet by mouth daily.           Vitamin D3 25 MCG (1000 UT) Tabs  Commonly known as:  VITAMIN D3 [id number] Senna (SENOKOT) tab 17.2 mg 02/11/20 2031 Given      [id number] Thiamine HCl (Vitamin B-1) tab 100 mg 02/12/20 0834 Given      [id number] aspirin tab 325 mg (Or Linked Group #1) 02/12/20 0833 Given      [id number] atorvastatin (LIPITOR) tab 80 mg 0 BASIC METABOLIC PANEL (8) [[id number]] (Abnormal)  Resulted: 02/12/20 0702, Result status: Final result   Ordering provider:  Claude Hanson MD  02/12/20 0015 Resulting lab:  [facility]    Specimen Information    Type Source Collected On   Blood — 02/12/20 06 Order Status:  Completed Lab Status:  Final result Updated:  02/08/20 1116    Specimen:  Other from Nares      Mrsa Culture No MRSA Isolated         H&P - H&P Note      H&P signed by Estefanía Burns MD at 2/7/2020  9:07 AM  Version 3 of 3    Autho Performed by Khris Mcknight MD at [address] 10/4/2013    Performed by Khris Mcknight MD at [address] [facility] 3 LEVEL N/A 4/27/2015    Performed by Mitesh Giang MD at [facility] Neck: Supple, symmetrical   Lungs:   Clear to auscultation bilaterally. Normal effort   Chest wall:  No tenderness or deformity. Heart:  Regular rate and rhythm, S1, S2 normal,no LE edema   Abdomen:   Soft, non-tender.  Bowel sounds normal. . Non distende concern for acute ischemia/infarction, an MRI of the brain would be recommended for further evaluation. Critical results were discussed with Dr. Terry Ponce by Dr. Martir Colón at [address] hours on 2/7/2020. Critical results were read back.    Dictated by: Mona Servin **GERD-Stable. Continue home meds  **depression-stable, monitor[LM.3]    **chest congestion/cough  -CXR ordered[LM.4]    **PPx-scds[LM.3]    Outpatient records or previous hospital records reviewed.      Further recommendations pending patient's clinical co Diagnosis Date   • Bilateral carotid artery stenosis 7/16/2018   • Depression    • Esophageal reflux    • Hyperlipidemia    • Stroke [facility]) 2-6-11   • Unspecified essential hypertension         PSH  Past Surgical History:   Procedure Laterality Date   • MAMTA Comment: 2 drinks daily       Fam Hx  Family History   Problem Relation Age of Onset   • Heart Disorder Father         MI   • Cancer Mother         breast ca   • Heart Disorder Sister         stroke        Review of Systems  Comprehensive ROS reviewed Result Date: 2/7/2020     CONCLUSION:  1. No acute intracranial hemorrhage or hydrocephalus. 2. Moderate chronic small vessel ischemic disease.  If there is clinical concern for acute ischemia/infarction, an MRI of the brain would be recommended for further Dr. Bell Apt at [phone] hours on 2/7/2020. Critical results were read back.    Dictated by: Dipak Amaral MD on 2/07/2020 at 6:59     Approved by: Dipak Amaral MD on 2/07/2020 at 7:14             ASSESSMENT / PLAN:[LM.1]       79 yo with HTN/HL, GERD, at 2/7/2020  9:04 AM              [facility] Hospitalist H&P       CC:[LM.1] acute CVA[LM.2]     PCP: Alfrieda Cranker, MD[LM.1]   77 yo with HTN/HL, GERD, cerebrovascular dz with hx CVA (no residual), depression who is admitted for acute CVA.[LM.2]   History of Prese Performed by Hua Ayoub MD at [address] [facility]   • TRANSFORAMINAL EPIDURAL - LUMBAR Right 3/9/2016    Performed by Hua Ayoub MD at [address] [facility]        ALL:  No Known Allergies     Home Medications:  No outpatient appropriate affect,  Memory[LM.1] ok[LM. 2]     Diagnostic Data:    CBC/Chem  Recent Labs   Lab 02/07/20  0020 02/07/20  0446   WBC 8.1 8.3   HGB 14.3 13.3   MCV 97.7 97.1   .0 148.0*   INR 0.95  --        Recent Labs   Lab 02/07/20  0020 02/07/20  0 intracranial or cervical arterial vasculature. 2. 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 Moderate stenosis seen at the left common carotid artery origin.   Mild to moderate Electronically signed by Angélica Mead MD on 2/7/2020  9:01 AM   Attribution Key    LM. 1 - Angélica Mead MD on 2/7/2020  8:19 AM  LM. 2 - Angélica Mead MD on 2/7/2020  8:49 AM  LM. 3 - Angélica Mead MD on 2/7/2020  8 concern for acute ischemia/infarction, an MRI of the brain would be recommended for further evaluation  MRI CONCLUSION:       1. Scattered regions of acute infarcts throughout the right MCA territory as described above.        2. Marked FLAIR abnormalities L2-L5 decomp   • COLONOSCOPY, POSSIBLE BIOPSY, POSSIBLE POLYPECTOMY [address]30 N/A 7/26/2017    Performed by Ciara Staley MD at [facility]   • HERNIA SURGERY     • IR ANGIOGRAM CEREBRAL CAROTID UNILATERAL  2/7/2020        • IR INTRA ARTERIAL S Heparin Sodium (Porcine) 5000 UNIT/ML injection 5,000 Units, 5,000 Units, Subcutaneous, 2 times per day  hydrALAzine HCl (APRESOLINE) injection 10 mg, 10 mg, Intravenous, Q4H PRN  docusate sodium (COLACE) cap 100 mg, 100 mg, Oral, BID    Or  docusate sodiu PEG 3350 (MIRALAX) powder packet 17 g, 17 g, Oral, Daily PRN  magnesium hydroxide (MILK OF MAGNESIA) 400 MG/5ML suspension 30 mL, 30 mL, Oral, Daily PRN  bisacodyl (DULCOLAX) rectal suppository 10 mg, 10 mg, Rectal, Daily PRN  Fleet Enema (FLEET) 7-19 GM/1 BUN 20 02/10/2020     02/10/2020    K 3.7 02/10/2020     02/10/2020    CO2 25.0 02/10/2020     02/10/2020    CA 8.3 02/10/2020    PGLU 126 02/10/2020       Review of Systems:  Complete review of systems performed and negative except as items.  Following commands                                                                                                                                                                                         ASSESSMEN Author:  Deondre Hurt PTA Service:  Rehab Author Type:  Physical Therapy Assistant    Filed:  2/10/2020  1:07 PM Date of Service:  2/10/2020  1:01 PM Status:  Signed    :  Deondre Hurt PTA (Physical Therapy Assistant)        PHYSICAL THERA Performed by Cristian Anaya MD at [address]   • OTHER SURGICAL HISTORY      hand surgery right tendon   • OTHER SURGICAL HISTORY  1/16/2013    right hip ORIF of intertrochanteric fx by Dr. Lynnette Babb   • OTHER SURGICAL HISTORY  2010    ORIF left prox humerus CMS Modifier (G-Code): CN[AR.2]    FUNCTIONAL ABILITY STATUS  Gait Assessment[AR.1]   Gait Assistance: Not tested  Distance (ft): 0           Comment : remains snow lift[AR.2]    Skilled Therapy Provided:     Pt presented in supine upon PT/Aide arrival. P At this time, Pt. presents with decreased balance, impaired strength, difficulty with gait/transfers resulting in downgrade of overall functional mobility.   Due to above deficits, Pt will benefit from continued IP PT, so that patient may achieve highest fu Related Notes:  Original Note by Hussein Rivera OT (Occupational Therapist) filed at 2/9/2020  2:56 PM       OCCUPATIONAL THERAPY EVALUATION - INPATIENT     Room Number: 8077/1991-W  Evaluation Date: 2/9/2020  Type of Evaluation: Initial  Presenting Prob • IR ANGIOGRAM CEREBRAL CAROTID UNILATERAL  2/7/2020        • IR INTRA ARTERIAL STROKE INTERVENTION  2/7/2020        • LUMBAR EPIDURAL N/A 11/10/2014    Performed by Tyson Jimenez MD at [address] [place] [address] N/A 9/15/2014 Upper extremity ROM is within functional limits except L UE- no AROM    Upper extremity strength is within functional limits except L UE- 0/5    COORDINATION  Gross Motor    impaired L UE   Fine Motor    impaired L UE          ACTIVITY TOLERANCE chair reclined back for increased safety. RN in room to monitor patient. Patient End of Session: Up in chair; With [facility] staff;Call light within reach; Needs met;RN aware of session/findings; All patient questions and concerns addressed; Family present    ASSES OT Discharge Recommendations: Acute rehabilitation  OT Device Recommendations: TBD    PLAN  OT Treatment Plan: Balance activities; ADL training;Functional transfer training;UE strengthening/ROM; Patient/Family education;Patient/Family training; Compensatory t 1. Scattered regions of acute infarcts throughout the right MCA territory as described above. 2. Marked FLAIR abnormalities the white matter are statistically likely sequelae of chronic small vessel ischemic disease.      3. Examination is significant Performed by Cristian Anaya MD at [address]   • OTHER SURGICAL HISTORY      hand surgery right tendon   • OTHER SURGICAL HISTORY  1/16/2013    right hip ORIF of intertrochanteric fx by Dr. Lynnette Babb   • OTHER SURGICAL HISTORY  2010    ORIF left prox humerus -   Toileting, which includes using toilet, bedpan or urinal? : Total  -   Putting on and taking off regular upper body clothing?: Total  -   Taking care of personal grooming such as brushing teeth?: Total  -   Eating meals?: Total    AM-PAC Score:  Score: impairment often benefit from Acute rehab.  In this OT evaluation patient presents with the following performance deficits: left hemiparesis, increased pain, decreased strength and balance, decreased endurance, decreased knowledge of adaptive techniques, de Patient will be minimum assistance with left AAROM HEP (home exercise program). [MS.1]         Attribution Mckeon    MS. 1 - April Smith OT on 2/9/2020  2:44 PM                        Video Swallow Study Note - Video Swallow Study Notes      Video Swallow S Current Diet Consistency: NPO  Prior Level of Function: Independent  Prior Living Situation: Home with spouse  History of Recent: (reports cough and head congestion last 2 weeks)  Precautions: Aspiration      Reason for Referral: Stroke protocol[LK.2]    F Laryngeal Penetration: Before the swallow;During the swallow  Tracheal Aspiration: None  Cough/Throat Clear Response: No     PUREE  Oral Phase of Swallow (VFSS - Puree):  Within Functional Limits  Triggered at: Valleculae  Residue Severity, Location: (None Goal #2 The patient/family/caregiver will demonstrate understanding and implementation of aspiration precautions and swallow strategies independently over 2 session(s).     Not Addressed   Goal #3 The patient will tolerate trial upgrade of chopped/soft cons sided, despite towel rolls for positioning assistance. Pt awake, alert, and responsive. DHT in place to left nare. Pt sounded nasally congested and Pt c/o globus sensation in throat due to presence of DHT. Vocal quality remained clear throughout.  Oral care Number of Visits to Meet Established Goals: 7[LK.2]    Session: 2    If you have any questions, please contact Zak Velazquez 87 CCC-SLP/L, pager [phone]  Speech-LanguagePathologist[LK.1]      Electronically signed by IRIS Rivas on 2/10/2 2/12/2020 12:30 PM [facility] CARD HOLTER RM [address] Cardio Holter and ECG [facility] AT [facility]      Multidisciplinary Problems     Active Goals     Not on file            Discharge Treatment Preferences       Most Recent Value   Preferences   Facility placement

## (undated) NOTE — IP AVS SNAPSHOT
[address]            (For Outpatient Use Only) Initial Admit Date: 1/19/2022   Inpt/Obs Admit Date: Inpt: N/A / Obs: 01/19/22   Discharge Date:    Aristides Yu:  [de-identified]   MRN: [de-identified]   CSN: [id number]   CEID: [id number] [employer] INSURANCE   Payor:  Plan:    Group Number:  Insurance Type:    Subscriber Name:  Subscriber :    Subscriber ID:  Pt Rel to Subscriber:    Hospital Account Financial Class: Medicare    2022

## (undated) NOTE — IP AVS SNAPSHOT
[address]            (For Outpatient Use Only) Initial Admit Date: 2/6/2020   Inpt/Obs Admit Date: Inpt: 2/7/20 / Obs: N/A   Discharge Date:    Montserrat Mccarty:  [de-identified]   MRN: [de-identified]   CSN: [id number]   CEID: [id number] Payor:  Plan:    Group Number:  Insurance Type:    Subscriber Name:  Subscriber :    Subscriber ID:  Pt Rel to Subscriber:    Hospital Account Financial Class: Medicare    2020